# Patient Record
Sex: FEMALE | Race: BLACK OR AFRICAN AMERICAN | NOT HISPANIC OR LATINO | Employment: STUDENT | URBAN - METROPOLITAN AREA
[De-identification: names, ages, dates, MRNs, and addresses within clinical notes are randomized per-mention and may not be internally consistent; named-entity substitution may affect disease eponyms.]

---

## 2023-04-01 LAB
CHLAMYDIA TRACH., AMPLIFIED: NEGATIVE
N. GONORRHEA, AMPLIFIED: NEGATIVE
TRICHOMONAS VAGINALIS: NEGATIVE
URINE CULTURE: NO GROWTH

## 2023-07-06 ENCOUNTER — APPOINTMENT (OUTPATIENT)
Dept: LAB | Facility: LAB | Age: 23
End: 2023-07-06
Payer: COMMERCIAL

## 2023-07-06 LAB
ABO GROUP (TYPE) IN BLOOD: NORMAL
ALANINE AMINOTRANSFERASE (SGPT) (U/L) IN SER/PLAS: 33 U/L (ref 7–45)
ALBUMIN (G/DL) IN SER/PLAS: 4.2 G/DL (ref 3.4–5)
ALKALINE PHOSPHATASE (U/L) IN SER/PLAS: 90 U/L (ref 33–110)
ANION GAP IN SER/PLAS: 15 MMOL/L (ref 10–20)
ANTIBODY SCREEN: NORMAL
ASPARTATE AMINOTRANSFERASE (SGOT) (U/L) IN SER/PLAS: 18 U/L (ref 9–39)
BILIRUBIN TOTAL (MG/DL) IN SER/PLAS: 0.3 MG/DL (ref 0–1.2)
CALCIUM (MG/DL) IN SER/PLAS: 10.4 MG/DL (ref 8.6–10.6)
CARBON DIOXIDE, TOTAL (MMOL/L) IN SER/PLAS: 23 MMOL/L (ref 21–32)
CHLORIDE (MMOL/L) IN SER/PLAS: 102 MMOL/L (ref 98–107)
CREATININE (MG/DL) IN SER/PLAS: 0.7 MG/DL (ref 0.5–1.05)
CREATININE (MG/DL) IN URINE: 390 MG/DL (ref 20–320)
ESTIMATED AVERAGE GLUCOSE FOR HBA1C: 111 MG/DL
GFR FEMALE: >90 ML/MIN/1.73M2
GLUCOSE (MG/DL) IN SER/PLAS: 82 MG/DL (ref 74–99)
HEMOGLOBIN A1C/HEMOGLOBIN TOTAL IN BLOOD: 5.5 %
HEPATITIS B VIRUS SURFACE AG PRESENCE IN SERUM: NONREACTIVE
HEPATITIS C VIRUS AB PRESENCE IN SERUM: NONREACTIVE
HIV 1/ 2 AG/AB SCREEN: NONREACTIVE
LACTATE DEHYDROGENASE (U/L) IN SER/PLAS BY LAC->PYR RXN: 145 U/L (ref 84–246)
POTASSIUM (MMOL/L) IN SER/PLAS: 4.5 MMOL/L (ref 3.5–5.3)
PROTEIN (MG/DL) IN URINE: 21 MG/DL (ref 5–24)
PROTEIN TOTAL: 7.7 G/DL (ref 6.4–8.2)
PROTEIN/CREATININE (MG/MG) IN URINE: 0.05 MG/MG CREAT (ref 0–0.17)
RH FACTOR: NORMAL
RUBELLA VIRUS IGG AB: POSITIVE
SODIUM (MMOL/L) IN SER/PLAS: 135 MMOL/L (ref 136–145)
SYPHILIS TOTAL AB: NONREACTIVE
UREA NITROGEN (MG/DL) IN SER/PLAS: 7 MG/DL (ref 6–23)

## 2023-07-07 LAB
CHLAMYDIA TRACH., AMPLIFIED: NEGATIVE
N. GONORRHEA, AMPLIFIED: NEGATIVE
URINE CULTURE: NORMAL

## 2023-07-10 LAB
HEMOGLOBIN A2: 2.7 %
HEMOGLOBIN A: 96.1 %
HEMOGLOBIN F: 1.2 %
HEMOGLOBIN IDENTIFICATION INTERPRETATION: NORMAL
PATH REVIEW-HGB IDENTIFICATION: NORMAL

## 2023-08-21 PROBLEM — G47.00 INSOMNIA: Status: ACTIVE | Noted: 2023-08-21

## 2023-08-21 PROBLEM — K59.00 CONSTIPATION: Status: ACTIVE | Noted: 2023-08-21

## 2023-08-21 PROBLEM — E66.01 OBESITY, CLASS III, BMI 40-49.9 (MORBID OBESITY) (MULTI): Status: ACTIVE | Noted: 2023-08-21

## 2023-08-21 PROBLEM — K21.9 GERD (GASTROESOPHAGEAL REFLUX DISEASE): Status: ACTIVE | Noted: 2023-08-21

## 2023-08-21 PROBLEM — Z32.02 NEGATIVE PREGNANCY TEST: Status: ACTIVE | Noted: 2023-08-21

## 2023-08-21 PROBLEM — O92.70 UNSPECIFIED DISORDERS OF LACTATION (HHS-HCC): Status: ACTIVE | Noted: 2023-08-21

## 2023-08-21 PROBLEM — Z3A.11 11 WEEKS GESTATION OF PREGNANCY (HHS-HCC): Status: ACTIVE | Noted: 2023-08-21

## 2023-08-21 PROBLEM — N81.9 PROLAPSE OF FEMALE PELVIC ORGANS: Status: ACTIVE | Noted: 2023-08-21

## 2023-08-21 PROBLEM — N81.4: Status: ACTIVE | Noted: 2023-08-21

## 2023-08-21 PROBLEM — Z34.90 PREGNANCY (HHS-HCC): Status: ACTIVE | Noted: 2023-08-21

## 2023-08-21 PROBLEM — R35.0 URINARY FREQUENCY: Status: ACTIVE | Noted: 2023-08-21

## 2023-08-21 PROBLEM — O34.529: Status: ACTIVE | Noted: 2023-08-21

## 2023-08-21 PROBLEM — O99.210 OBESITY IN PREGNANCY (HHS-HCC): Status: ACTIVE | Noted: 2023-08-21

## 2023-08-21 PROBLEM — O99.019 ANEMIA OF PREGNANCY (HHS-HCC): Status: ACTIVE | Noted: 2023-08-21

## 2023-08-21 PROBLEM — O09.93 HIGH-RISK PREGNANCY, THIRD TRIMESTER (HHS-HCC): Status: ACTIVE | Noted: 2023-08-21

## 2023-08-21 PROBLEM — I10 CHRONIC HYPERTENSION: Status: ACTIVE | Noted: 2023-08-21

## 2023-08-21 PROBLEM — O21.9 NAUSEA AND VOMITING DURING PREGNANCY (HHS-HCC): Status: ACTIVE | Noted: 2023-08-21

## 2023-08-21 PROBLEM — E66.813 OBESITY, CLASS III, BMI 40-49.9 (MORBID OBESITY): Status: ACTIVE | Noted: 2023-08-21

## 2023-08-21 RX ORDER — DICYCLOMINE HYDROCHLORIDE 10 MG/1
10 CAPSULE ORAL
COMMUNITY
Start: 2018-06-07 | End: 2023-10-19 | Stop reason: ALTCHOICE

## 2023-08-21 RX ORDER — .BETA.-CAROTENE, ASCORBIC ACID, CHOLECALCIFEROL, .ALPHA.-TOCOPHEROL ACETATE, DL-, THIAMINE, RIBOFLAVIN, NIACINAMIDE, PYRIDOXINE HYDROCHLORIDE, FOLIC ACID, CYANOCOBALAMIN, CALCIUM PANTOTHENATE, CALCIUM CARBONATE, FERROUS FUMARATE, ZINC OXIDE AND DOCUSATE SODIUM 1000; 100; 400; 30; 3; 3; 15; 20; 1; 12; 7; 200; 29; 20; 25 [IU]/1; MG/1; [IU]/1; MG/1; MG/1; MG/1; MG/1; MG/1; MG/1; UG/1; MG/1; MG/1; MG/1; MG/1; MG/1
1 TABLET ORAL DAILY
COMMUNITY
Start: 2023-07-06 | End: 2023-10-19 | Stop reason: SDUPTHER

## 2023-08-21 RX ORDER — IBUPROFEN 600 MG/1
600 TABLET ORAL EVERY 6 HOURS
COMMUNITY
Start: 2022-04-02 | End: 2023-10-19

## 2023-08-21 RX ORDER — POLYETHYLENE GLYCOL 3350 17 G/17G
17 POWDER, FOR SOLUTION ORAL 2 TIMES DAILY
COMMUNITY
End: 2023-10-19 | Stop reason: ALTCHOICE

## 2023-08-21 RX ORDER — DIPHENHYDRAMINE HCL 25 MG
25 CAPSULE ORAL NIGHTLY
Status: ON HOLD | COMMUNITY
End: 2024-02-08 | Stop reason: ALTCHOICE

## 2023-08-21 RX ORDER — PYRIDOXINE HCL (VITAMIN B6) 25 MG
25 TABLET ORAL EVERY 6 HOURS
COMMUNITY
End: 2023-10-19 | Stop reason: SDUPTHER

## 2023-08-21 RX ORDER — NAPROXEN 500 MG/1
500 TABLET ORAL 2 TIMES DAILY PRN
COMMUNITY
Start: 2016-09-15 | End: 2023-10-19 | Stop reason: ALTCHOICE

## 2023-08-21 RX ORDER — PRENATAL WITH FERROUS FUM AND FOLIC ACID 3080; 920; 120; 400; 22; 1.84; 3; 20; 10; 1; 12; 200; 27; 25; 2 [IU]/1; [IU]/1; MG/1; [IU]/1; MG/1; MG/1; MG/1; MG/1; MG/1; MG/1; UG/1; MG/1; MG/1; MG/1; MG/1
1 TABLET ORAL DAILY
COMMUNITY
Start: 2020-01-28 | End: 2023-10-19 | Stop reason: SDUPTHER

## 2023-08-21 RX ORDER — DOCUSATE SODIUM 100 MG/1
100 CAPSULE, LIQUID FILLED ORAL 2 TIMES DAILY
COMMUNITY
Start: 2018-06-12 | End: 2023-10-19 | Stop reason: ALTCHOICE

## 2023-08-21 RX ORDER — ACETAMINOPHEN 500 MG
TABLET ORAL
COMMUNITY
Start: 2022-03-31 | End: 2024-01-18 | Stop reason: WASHOUT

## 2023-08-21 RX ORDER — NIFEDIPINE 30 MG/1
30 TABLET, FILM COATED, EXTENDED RELEASE ORAL DAILY
COMMUNITY
End: 2023-10-19 | Stop reason: ALTCHOICE

## 2023-08-21 RX ORDER — NAPROXEN SODIUM 220 MG/1
2 TABLET, FILM COATED ORAL DAILY
COMMUNITY
Start: 2023-07-06 | End: 2023-10-19 | Stop reason: SDUPTHER

## 2023-08-21 RX ORDER — ONDANSETRON HYDROCHLORIDE 8 MG/1
8 TABLET, FILM COATED ORAL EVERY 8 HOURS PRN
COMMUNITY
End: 2023-10-19 | Stop reason: ALTCHOICE

## 2023-08-21 RX ORDER — METOCLOPRAMIDE 5 MG/1
10 TABLET ORAL EVERY 6 HOURS PRN
COMMUNITY
End: 2023-10-19 | Stop reason: ALTCHOICE

## 2023-08-31 LAB
CHLAMYDIA TRACH., AMPLIFIED: NEGATIVE
CLUE CELLS: ABNORMAL
N. GONORRHEA, AMPLIFIED: NEGATIVE
NUGENT SCORE: 3
TRICHOMONAS VAGINALIS: NEGATIVE
YEAST: PRESENT

## 2023-10-02 ENCOUNTER — ANCILLARY PROCEDURE (OUTPATIENT)
Dept: RADIOLOGY | Facility: CLINIC | Age: 23
End: 2023-10-02
Payer: COMMERCIAL

## 2023-10-02 DIAGNOSIS — O99.212 OBESITY AFFECTING PREGNANCY IN SECOND TRIMESTER (HHS-HCC): ICD-10-CM

## 2023-10-02 DIAGNOSIS — Z36.3 ENCOUNTER FOR ANTENATAL SCREENING FOR MALFORMATIONS (HHS-HCC): ICD-10-CM

## 2023-10-02 PROCEDURE — 76811 OB US DETAILED SNGL FETUS: CPT

## 2023-10-02 PROCEDURE — 76817 TRANSVAGINAL US OBSTETRIC: CPT

## 2023-10-02 PROCEDURE — 76812 OB US DETAILED ADDL FETUS: CPT | Performed by: OBSTETRICS & GYNECOLOGY

## 2023-10-02 PROCEDURE — 76817 TRANSVAGINAL US OBSTETRIC: CPT | Performed by: OBSTETRICS & GYNECOLOGY

## 2023-10-02 PROCEDURE — 76811 OB US DETAILED SNGL FETUS: CPT | Performed by: OBSTETRICS & GYNECOLOGY

## 2023-10-02 PROCEDURE — 76812 OB US DETAILED ADDL FETUS: CPT

## 2023-10-02 ASSESSMENT — EDINBURGH POSTNATAL DEPRESSION SCALE (EPDS)
I HAVE FELT SCARED OR PANICKY FOR NO GOOD REASON: NO, NOT AT ALL
THINGS HAVE BEEN GETTING ON TOP OF ME: NO, MOST OF THE TIME I HAVE COPED QUITE WELL
I HAVE BEEN ANXIOUS OR WORRIED FOR NO GOOD REASON: NO, NOT AT ALL
I HAVE BEEN SO UNHAPPY THAT I HAVE BEEN CRYING: NO, NEVER
I HAVE LOOKED FORWARD WITH ENJOYMENT TO THINGS: RATHER LESS THAN I USED TO
TOTAL SCORE: 3
I HAVE BEEN SO UNHAPPY THAT I HAVE HAD DIFFICULTY SLEEPING: NOT AT ALL
I HAVE BLAMED MYSELF UNNECESSARILY WHEN THINGS WENT WRONG: NO, NEVER
THE THOUGHT OF HARMING MYSELF HAS OCCURRED TO ME: NEVER
I HAVE FELT SAD OR MISERABLE: NO, NOT AT ALL
I HAVE BEEN ABLE TO LAUGH AND SEE THE FUNNY SIDE OF THINGS: NOT QUITE SO MUCH NOW

## 2023-10-19 ENCOUNTER — PHARMACY VISIT (OUTPATIENT)
Dept: PHARMACY | Facility: CLINIC | Age: 23
End: 2023-10-19
Payer: COMMERCIAL

## 2023-10-19 ENCOUNTER — ANCILLARY PROCEDURE (OUTPATIENT)
Dept: RADIOLOGY | Facility: CLINIC | Age: 23
End: 2023-10-19
Payer: COMMERCIAL

## 2023-10-19 ENCOUNTER — ROUTINE PRENATAL (OUTPATIENT)
Dept: MATERNAL FETAL MEDICINE | Facility: CLINIC | Age: 23
End: 2023-10-19
Payer: COMMERCIAL

## 2023-10-19 VITALS
HEART RATE: 88 BPM | WEIGHT: 285 LBS | SYSTOLIC BLOOD PRESSURE: 137 MMHG | BODY MASS INDEX: 47.43 KG/M2 | DIASTOLIC BLOOD PRESSURE: 85 MMHG

## 2023-10-19 DIAGNOSIS — K21.9 GASTROESOPHAGEAL REFLUX DISEASE WITHOUT ESOPHAGITIS: ICD-10-CM

## 2023-10-19 DIAGNOSIS — Z36.3 ENCOUNTER FOR ANTENATAL SCREENING FOR MALFORMATIONS (HHS-HCC): ICD-10-CM

## 2023-10-19 DIAGNOSIS — Z3A.22 22 WEEKS GESTATION OF PREGNANCY (HHS-HCC): ICD-10-CM

## 2023-10-19 DIAGNOSIS — N81.11 MIDLINE CYSTOCELE: ICD-10-CM

## 2023-10-19 DIAGNOSIS — O10.919 CHRONIC HYPERTENSION AFFECTING PREGNANCY (HHS-HCC): Primary | ICD-10-CM

## 2023-10-19 DIAGNOSIS — O99.212 OBESITY AFFECTING PREGNANCY IN SECOND TRIMESTER (HHS-HCC): ICD-10-CM

## 2023-10-19 DIAGNOSIS — O30.042 DICHORIONIC DIAMNIOTIC TWIN PREGNANCY IN SECOND TRIMESTER (HHS-HCC): ICD-10-CM

## 2023-10-19 DIAGNOSIS — O21.9 NAUSEA AND VOMITING IN PREGNANCY PRIOR TO 22 WEEKS GESTATION (HHS-HCC): ICD-10-CM

## 2023-10-19 DIAGNOSIS — O30.92: ICD-10-CM

## 2023-10-19 PROBLEM — O92.70 UNSPECIFIED DISORDERS OF LACTATION (HHS-HCC): Status: RESOLVED | Noted: 2023-08-21 | Resolved: 2023-10-19

## 2023-10-19 PROBLEM — N81.4: Status: RESOLVED | Noted: 2023-08-21 | Resolved: 2023-10-19

## 2023-10-19 PROBLEM — O99.210 OBESITY IN PREGNANCY (HHS-HCC): Status: RESOLVED | Noted: 2023-08-21 | Resolved: 2023-10-19

## 2023-10-19 PROBLEM — O09.93 HIGH-RISK PREGNANCY, THIRD TRIMESTER (HHS-HCC): Status: RESOLVED | Noted: 2023-08-21 | Resolved: 2023-10-19

## 2023-10-19 PROBLEM — R35.0 URINARY FREQUENCY: Status: RESOLVED | Noted: 2023-08-21 | Resolved: 2023-10-19

## 2023-10-19 PROBLEM — O34.529: Status: RESOLVED | Noted: 2023-08-21 | Resolved: 2023-10-19

## 2023-10-19 PROBLEM — Z34.90 PREGNANCY (HHS-HCC): Status: RESOLVED | Noted: 2023-08-21 | Resolved: 2023-10-19

## 2023-10-19 PROCEDURE — 99214 OFFICE O/P EST MOD 30 MIN: CPT

## 2023-10-19 PROCEDURE — 76816 OB US FOLLOW-UP PER FETUS: CPT | Performed by: STUDENT IN AN ORGANIZED HEALTH CARE EDUCATION/TRAINING PROGRAM

## 2023-10-19 PROCEDURE — RXMED WILLOW AMBULATORY MEDICATION CHARGE

## 2023-10-19 PROCEDURE — 99214 OFFICE O/P EST MOD 30 MIN: CPT | Mod: 25,GC | Performed by: OBSTETRICS & GYNECOLOGY

## 2023-10-19 PROCEDURE — 76816 OB US FOLLOW-UP PER FETUS: CPT

## 2023-10-19 RX ORDER — ACETAMINOPHEN 500 MG
1 TABLET ORAL 2 TIMES DAILY
Qty: 1 EACH | Refills: 0 | Status: SHIPPED | OUTPATIENT
Start: 2023-10-19

## 2023-10-19 RX ORDER — PYRIDOXINE HCL (VITAMIN B6) 25 MG
25 TABLET ORAL EVERY 6 HOURS
Qty: 120 TABLET | Refills: 3 | Status: SHIPPED | OUTPATIENT
Start: 2023-10-19 | End: 2024-02-09 | Stop reason: HOSPADM

## 2023-10-19 RX ORDER — ASPIRIN 81 MG/1
162 TABLET ORAL DAILY
Qty: 180 TABLET | Refills: 2 | Status: SHIPPED | OUTPATIENT
Start: 2023-10-19 | End: 2024-01-28 | Stop reason: HOSPADM

## 2023-10-19 RX ORDER — FAMOTIDINE 20 MG/1
20 TABLET, FILM COATED ORAL DAILY
Qty: 90 TABLET | Refills: 2 | Status: SHIPPED | OUTPATIENT
Start: 2023-10-19 | End: 2023-12-21

## 2023-10-19 ASSESSMENT — PAIN SCALES - GENERAL: PAINLEVEL_OUTOF10: 0 - NO PAIN

## 2023-10-19 ASSESSMENT — PAIN - FUNCTIONAL ASSESSMENT: PAIN_FUNCTIONAL_ASSESSMENT: 0-10

## 2023-10-19 NOTE — PROGRESS NOTES
MFM Follow-up  10/19/2023         SUBJECTIVE    HPI: Ivanna Addison is a 23 y.o.  at 22w3d here for RPNV for cHTN and di/di twin gestation. Denies contractions, bleeding, or LOF. Reports normal fetal movement. Still having some nausea.    OBJECTIVE    Visit Vitals  /85   Pulse 88   Wt 129 kg (285 lb)   LMP 2023   BMI 47.43 kg/m²   OB Status Pregnant   Smoking Status Never   BSA 2.43 m²        FHT: US    ASSESSMENT & PLAN    Ivanna Addison is a 23 y.o.  at 22w3d here for the following concerns we addressed today:    Chronic hypertension affecting pregnancy  Normotensive today. Needs new cuff at home- her current one is too small. Larger cuff sent. bASA refilled    22 weeks gestation of pregnancy  Up to date. 2nd tri labs and flu vaccine at next visit    Dichorionic diamniotic twin pregnancy in second trimester  Anatomy completion US today. 24week growth US in 2 weeks    Prolapse of female pelvic organs  No currently having symptoms. She has PFPT scheduled. Urogyn consult     GERD (gastroesophageal reflux disease)  Has been worsening. Pepcid rxed     No orders of the defined types were placed in this encounter.       RTC in  2 weeks    Patient seen and evaluated with Dr. Vandana Munoz MD PGY-1    I saw and evaluated the patient. I personally obtained the key and critical portions of the history and physical exam or was physically present for key and critical portions performed by the resident/fellow. I reviewed the resident/fellow's documentation and discussed the patient with the resident/fellow. I agree with the resident/fellow's medical decision making as documented in the note.    Doing well overall. Severe GERD - pepcid started at daily dose, recommend increasing to twice weekly if not improving. Has urogyn appointment scheduled on . Growth in 2 weeks, will see her at that time as well.    Jesus Ross MD  Maternal Fetal Medicine

## 2023-10-19 NOTE — ASSESSMENT & PLAN NOTE
Normotensive today. Needs new cuff at home- her current one is too small. Larger cuff sent. Basilio refilled

## 2023-11-07 ENCOUNTER — OFFICE VISIT (OUTPATIENT)
Dept: UROLOGY | Facility: CLINIC | Age: 23
End: 2023-11-07
Payer: COMMERCIAL

## 2023-11-07 VITALS — SYSTOLIC BLOOD PRESSURE: 128 MMHG | DIASTOLIC BLOOD PRESSURE: 85 MMHG

## 2023-11-07 DIAGNOSIS — N81.10 BLADDER PROLAPSE, FEMALE, ACQUIRED: ICD-10-CM

## 2023-11-07 LAB
POC BILIRUBIN, URINE: ABNORMAL
POC BLOOD, URINE: NEGATIVE
POC GLUCOSE, URINE: NEGATIVE MG/DL
POC KETONES, URINE: ABNORMAL MG/DL
POC LEUKOCYTES, URINE: NEGATIVE
POC NITRITE,URINE: NEGATIVE
POC PH, URINE: 5.5 PH
POC PROTEIN, URINE: ABNORMAL MG/DL
POC SPECIFIC GRAVITY, URINE: >=1.03
POC UROBILINOGEN, URINE: 0.2 EU/DL

## 2023-11-07 PROCEDURE — 81003 URINALYSIS AUTO W/O SCOPE: CPT | Performed by: STUDENT IN AN ORGANIZED HEALTH CARE EDUCATION/TRAINING PROGRAM

## 2023-11-07 PROCEDURE — 3079F DIAST BP 80-89 MM HG: CPT | Performed by: STUDENT IN AN ORGANIZED HEALTH CARE EDUCATION/TRAINING PROGRAM

## 2023-11-07 PROCEDURE — 99215 OFFICE O/P EST HI 40 MIN: CPT | Performed by: STUDENT IN AN ORGANIZED HEALTH CARE EDUCATION/TRAINING PROGRAM

## 2023-11-07 PROCEDURE — 3074F SYST BP LT 130 MM HG: CPT | Performed by: STUDENT IN AN ORGANIZED HEALTH CARE EDUCATION/TRAINING PROGRAM

## 2023-11-07 NOTE — PROGRESS NOTES
"  HISTORY OF PRESENT ILLNESS:  This is a 23 @ y.o. female,who presents a a new patient for a prolapsed bladder. Patient is pregnant with twins currently. Patient noticed she has a prolapse due to the pregnancy. She can feel a small bulge when she wipes now. She states she would prefer to have a vaginal birth, if possible. Patient has a due date of February 19, 2024 which will be close to 40 weeks. Patient has three children with ages of 5, 3 and 1 years old currently. She is not planning to have any more children after this next birth. She states she will be having a tubal ligation surgery after the delivery. She does not seem to have any urinary symptoms prior to pregnancy.              HISTORY OF PRESENT ILLNESS:           Past Medical History  She has a past medical history of Chronic hypertension and Eczema.    Surgical History  She has a past surgical history that includes Cholecystectomy (N/A, 2018).     Social History  She reports that she has never smoked. She does not have any smokeless tobacco history on file. She reports that she does not drink alcohol and does not use drugs.    Family History  Family History   Problem Relation Name Age of Onset    Diabetes Maternal Grandmother      Hypertension Maternal Grandmother          Allergies  Patient has no known allergies.      A comprehensive 10+ review of systems was negative except for: see hpi                          PHYSICAL EXAMINATION:  BP Readings from Last 3 Encounters:   11/07/23 128/85   10/19/23 137/85   07/27/23 124/78      Wt Readings from Last 3 Encounters:   10/19/23 129 kg (285 lb)   07/27/23 125 kg (275 lb)   07/18/23 125 kg (276 lb 8 oz)      BMI: Estimated body mass index is 47.43 kg/m² as calculated from the following:    Height as of 7/18/23: 1.651 m (5' 5\").    Weight as of 10/19/23: 129 kg (285 lb).  BSA: Estimated body surface area is 2.43 meters squared as calculated from the following:    Height as of 7/18/23: 1.651 m (5' 5\").    " Weight as of 10/19/23: 129 kg (285 lb).  HEENT: Normocephalic, atraumatic, PER EOMI, nonicteric, trachea normal, thyroid normal, oropharynx normal.  CARDIAC: regular rate & rhythm, S1 & S2 normal.  No heaves, thrills, gallops or murmurs.  LUNGS: Clear to auscultation, no spinal or CV tenderness.  EXTREMITIES: No evidence of cyanosis, clubbing or edema.                 IMPRESSION AND PLAN:  24 yo with current twin pregnancy has c/o POP when not pregnant     Discusses etiology and mechanism of POP, also discussed treatment options  We discussed that best practice is minimum of 9-12 months after pregnancy for any corrective surgery  Follow up in May 2024 after birth

## 2023-11-08 PROBLEM — Z3A.25 25 WEEKS GESTATION OF PREGNANCY (HHS-HCC): Status: ACTIVE | Noted: 2023-08-21

## 2023-11-08 PROBLEM — O16.2 HYPERTENSION AFFECTING PREGNANCY IN SECOND TRIMESTER (HHS-HCC): Status: ACTIVE | Noted: 2023-08-21

## 2023-11-08 PROBLEM — O99.612 GASTROESOPHAGEAL REFLUX DURING PREGNANCY IN SECOND TRIMESTER, ANTEPARTUM (HHS-HCC): Status: ACTIVE | Noted: 2023-08-21

## 2023-11-09 ENCOUNTER — ROUTINE PRENATAL (OUTPATIENT)
Dept: MATERNAL FETAL MEDICINE | Facility: CLINIC | Age: 23
End: 2023-11-09
Payer: COMMERCIAL

## 2023-11-09 ENCOUNTER — APPOINTMENT (OUTPATIENT)
Dept: RADIOLOGY | Facility: CLINIC | Age: 23
End: 2023-11-09
Payer: COMMERCIAL

## 2023-11-09 ENCOUNTER — ANCILLARY PROCEDURE (OUTPATIENT)
Dept: RADIOLOGY | Facility: CLINIC | Age: 23
End: 2023-11-09
Payer: COMMERCIAL

## 2023-11-09 VITALS — BODY MASS INDEX: 48.81 KG/M2 | SYSTOLIC BLOOD PRESSURE: 123 MMHG | WEIGHT: 293 LBS | DIASTOLIC BLOOD PRESSURE: 78 MMHG

## 2023-11-09 DIAGNOSIS — O99.612 GASTROESOPHAGEAL REFLUX DURING PREGNANCY IN SECOND TRIMESTER, ANTEPARTUM (HHS-HCC): ICD-10-CM

## 2023-11-09 DIAGNOSIS — Z3A.25 25 WEEKS GESTATION OF PREGNANCY (HHS-HCC): ICD-10-CM

## 2023-11-09 DIAGNOSIS — Z36.3 ENCOUNTER FOR ANTENATAL SCREENING FOR MALFORMATIONS (HHS-HCC): ICD-10-CM

## 2023-11-09 DIAGNOSIS — Z03.74 ENCOUNTER FOR SUSPECTED PROBLEM WITH FETAL GROWTH RULED OUT: ICD-10-CM

## 2023-11-09 DIAGNOSIS — O30.049 TWIN PREGNANCY, DICHORIONIC/DIAMNIOTIC, UNSPECIFIED TRIMESTER (HHS-HCC): ICD-10-CM

## 2023-11-09 DIAGNOSIS — O16.2 HYPERTENSION AFFECTING PREGNANCY IN SECOND TRIMESTER (HHS-HCC): ICD-10-CM

## 2023-11-09 DIAGNOSIS — K21.9 GASTROESOPHAGEAL REFLUX DURING PREGNANCY IN SECOND TRIMESTER, ANTEPARTUM (HHS-HCC): ICD-10-CM

## 2023-11-09 DIAGNOSIS — N81.11 MIDLINE CYSTOCELE: ICD-10-CM

## 2023-11-09 DIAGNOSIS — R10.2 PELVIC PAIN IN PREGNANCY (HHS-HCC): ICD-10-CM

## 2023-11-09 DIAGNOSIS — O30.042 DICHORIONIC DIAMNIOTIC TWIN PREGNANCY IN SECOND TRIMESTER (HHS-HCC): Primary | ICD-10-CM

## 2023-11-09 DIAGNOSIS — Z23 INFLUENZA VACCINATION ADMINISTERED AT CURRENT VISIT: ICD-10-CM

## 2023-11-09 DIAGNOSIS — O26.899 PELVIC PAIN IN PREGNANCY (HHS-HCC): ICD-10-CM

## 2023-11-09 PROBLEM — O26.892 PELVIC PAIN AFFECTING PREGNANCY IN SECOND TRIMESTER, ANTEPARTUM (HHS-HCC): Status: ACTIVE | Noted: 2023-11-09

## 2023-11-09 PROCEDURE — 90686 IIV4 VACC NO PRSV 0.5 ML IM: CPT | Performed by: OBSTETRICS & GYNECOLOGY

## 2023-11-09 PROCEDURE — 99214 OFFICE O/P EST MOD 30 MIN: CPT | Performed by: OBSTETRICS & GYNECOLOGY

## 2023-11-09 PROCEDURE — 76816 OB US FOLLOW-UP PER FETUS: CPT

## 2023-11-09 PROCEDURE — 99214 OFFICE O/P EST MOD 30 MIN: CPT | Mod: GC | Performed by: OBSTETRICS & GYNECOLOGY

## 2023-11-09 NOTE — PROGRESS NOTES
MFM Follow-up  2023         SUBJECTIVE    HPI: Ivanna Addison is a 23 y.o.  at 25w3d with di/di twins here for MFM FUV for cHTN.     Denies vaginal bleeding, LOF. Good fetal movement. Has been having Carm[place] Wheeler contractions nightly, felt in R pelvis and lower back, along with sharp vaginal pain. Contractions lasting 30 seconds and irregular, but can occur every 1-2 minutes. Does not feel like labor, based on her prior pregnancies. Had belly band ordered about 6 weeks ago, but has not come in yet.    Denies PEC sxs, however has occasional headaches, less than once per week that resolve without medication.     OBJECTIVE    Visit Vitals  /78 Comment:    Wt 133 kg (293 lb 4.8 oz)   LMP 2023   BMI 48.81 kg/m²   OB Status Pregnant   Smoking Status Never   BSA 2.47 m²        FHT: US    ASSESSMENT & PLAN    Ivanna Addison is a 23 y.o.  at 25w3d here for the following concerns we addressed today:    Hypertension affecting pregnancy in second trimester  - Normotensive in office today. Worried home BP cuff is too small, because her home readings were high. Has not been taking BP at home due to cuff issue. Discussed bringing in cuff to confirm sizing.  - continue bASA  - Continue serial growths q4wks provided appropriate interval growth. Has growth US today    Prolapse of female pelvic organs  - Saw urogyn on   Discussed following up postpartum for any corrective surgery. Has appt scheduled 2024  - Patient interested in pelvic floor physical therapy. Referral placed    25 weeks gestation of pregnancy  - For 2nd trimester labs and 1hr GTT in 3 weeks, prior to next visit.  Flu shot today    Dichorionic diamniotic twin pregnancy in second trimester  - Has growth today  - Continue with q4 week growth, normal growth today.    Pelvic pain in pregnancy  - Nightly [place] Wheeler Hydration  - Discussed good hydration for uterine irritability, however she should go to triage if her  contractions are less than 5 minutes apart, persisting for longer than an hour, and painful    Gastroesophageal reflux during pregnancy in second trimester, antepartum  Reports only having issues with GERD twice since the last visit. Nightly burning pain when it happens       Orders Placed This Encounter   Procedures    Flu vaccine (IIV4) age 6 months and greater, preservative free    Glucose, 1 Hour Screen, Pregnancy     Standing Status:   Future     Standing Expiration Date:   2024     Order Specific Question:   Release result to MyChart     Answer:   Immediate [1]    CBC     Standing Status:   Future     Standing Expiration Date:   2024     Order Specific Question:   Release result to MyChart     Answer:   Immediate [1]    Syphilis Screen with Reflex     Standing Status:   Future     Standing Expiration Date:   2024     Order Specific Question:   Release result to MyChart     Answer:   Immediate [1]    Referral to Physical Therapy     Standing Status:   Future     Standing Expiration Date:   2024     Referral Priority:   Routine     Referral Type:   Consultation     Referral Reason:   Specialty Services Required     Requested Specialty:   Physical Therapy     Number of Visits Requested:   1        RTC in 4 weeks for FUV and growth US    Patient seen and evaluated with Dr. Vandana Roche MD      I saw and evaluated the patient. I personally obtained the key and critical portions of the history and physical exam or was physically present for key and critical portions performed by the resident/fellow. I reviewed the resident/fellow's documentation and discussed the patient with the resident/fellow. I agree with the resident/fellow's medical decision making as documented in the note.     Briefly,  at 25w3d with DCDA twins, CHTN, and pelvic prolapse. Patient disappointed that intervention for prolapse needs to be deferred but understands the reasoning. Normotensive. Normal  growth for twins. RTC for visit and US in 4 weeks, patient to have 3rd trimester labs drawn prior to that visit.    MD BETI WattersM

## 2023-11-09 NOTE — ASSESSMENT & PLAN NOTE
- Saw urogyn on 11/7  Discussed following up postpartum for any corrective surgery. Has appt scheduled 5/2024  - Patient interested in pelvic floor physical therapy. Referral placed

## 2023-11-09 NOTE — ASSESSMENT & PLAN NOTE
Reports only having issues with GERD twice since the last visit. Nightly burning pain when it happens

## 2023-11-09 NOTE — ASSESSMENT & PLAN NOTE
- Normotensive in office today. Worried home BP cuff is too small, because her home readings were high. Has not been taking BP at home due to cuff issue. Discussed bringing in cuff to confirm sizing.  - continue bASA  - Continue serial growths q4wks provided appropriate interval growth. Has growth US today

## 2023-11-09 NOTE — ASSESSMENT & PLAN NOTE
- Nightly Carmine Wheeler Hydration  - Discussed good hydration for uterine irritability, however she should go to triage if her contractions are less than 5 minutes apart, persisting for longer than an hour, and painful

## 2023-11-29 ENCOUNTER — PHARMACY VISIT (OUTPATIENT)
Dept: PHARMACY | Facility: CLINIC | Age: 23
End: 2023-11-29
Payer: MEDICAID

## 2023-11-29 PROCEDURE — RXMED WILLOW AMBULATORY MEDICATION CHARGE

## 2023-12-06 PROBLEM — Z3A.29 29 WEEKS GESTATION OF PREGNANCY (HHS-HCC): Status: ACTIVE | Noted: 2023-08-21

## 2023-12-07 ENCOUNTER — ROUTINE PRENATAL (OUTPATIENT)
Dept: MATERNAL FETAL MEDICINE | Facility: CLINIC | Age: 23
End: 2023-12-07
Payer: COMMERCIAL

## 2023-12-07 ENCOUNTER — ANCILLARY PROCEDURE (OUTPATIENT)
Dept: RADIOLOGY | Facility: CLINIC | Age: 23
End: 2023-12-07
Payer: COMMERCIAL

## 2023-12-07 ENCOUNTER — LAB (OUTPATIENT)
Dept: LAB | Facility: LAB | Age: 23
End: 2023-12-07
Payer: COMMERCIAL

## 2023-12-07 VITALS — WEIGHT: 293 LBS | DIASTOLIC BLOOD PRESSURE: 75 MMHG | BODY MASS INDEX: 49.56 KG/M2 | SYSTOLIC BLOOD PRESSURE: 122 MMHG

## 2023-12-07 DIAGNOSIS — K21.9 GASTROESOPHAGEAL REFLUX DURING PREGNANCY IN SECOND TRIMESTER, ANTEPARTUM (HHS-HCC): ICD-10-CM

## 2023-12-07 DIAGNOSIS — Z3A.29 29 WEEKS GESTATION OF PREGNANCY (HHS-HCC): Primary | ICD-10-CM

## 2023-12-07 DIAGNOSIS — R10.2 PELVIC PAIN IN PREGNANCY (HHS-HCC): ICD-10-CM

## 2023-12-07 DIAGNOSIS — O30.049 TWIN PREGNANCY, DICHORIONIC/DIAMNIOTIC, UNSPECIFIED TRIMESTER (HHS-HCC): ICD-10-CM

## 2023-12-07 DIAGNOSIS — O99.213 OBESITY AFFECTING PREGNANCY IN THIRD TRIMESTER (HHS-HCC): ICD-10-CM

## 2023-12-07 DIAGNOSIS — N81.11 MIDLINE CYSTOCELE: ICD-10-CM

## 2023-12-07 DIAGNOSIS — Z3A.25 25 WEEKS GESTATION OF PREGNANCY (HHS-HCC): ICD-10-CM

## 2023-12-07 DIAGNOSIS — O99.612 GASTROESOPHAGEAL REFLUX DURING PREGNANCY IN SECOND TRIMESTER, ANTEPARTUM (HHS-HCC): ICD-10-CM

## 2023-12-07 DIAGNOSIS — E66.01 OBESITY, CLASS III, BMI 40-49.9 (MORBID OBESITY) (MULTI): ICD-10-CM

## 2023-12-07 DIAGNOSIS — Z03.74 ENCOUNTER FOR SUSPECTED PROBLEM WITH FETAL GROWTH RULED OUT: ICD-10-CM

## 2023-12-07 DIAGNOSIS — O30.042 DICHORIONIC DIAMNIOTIC TWIN PREGNANCY IN SECOND TRIMESTER (HHS-HCC): ICD-10-CM

## 2023-12-07 DIAGNOSIS — O26.899 PELVIC PAIN IN PREGNANCY (HHS-HCC): ICD-10-CM

## 2023-12-07 DIAGNOSIS — O10.013 PRE-EXISTING ESSENTIAL HYPERTENSION COMPLICATING PREGNANCY, THIRD TRIMESTER (HHS-HCC): ICD-10-CM

## 2023-12-07 DIAGNOSIS — O16.2 HYPERTENSION AFFECTING PREGNANCY IN SECOND TRIMESTER (HHS-HCC): ICD-10-CM

## 2023-12-07 PROCEDURE — 99214 OFFICE O/P EST MOD 30 MIN: CPT | Mod: GC | Performed by: STUDENT IN AN ORGANIZED HEALTH CARE EDUCATION/TRAINING PROGRAM

## 2023-12-07 PROCEDURE — 76816 OB US FOLLOW-UP PER FETUS: CPT | Mod: 76

## 2023-12-07 PROCEDURE — 99214 OFFICE O/P EST MOD 30 MIN: CPT | Performed by: STUDENT IN AN ORGANIZED HEALTH CARE EDUCATION/TRAINING PROGRAM

## 2023-12-07 PROCEDURE — 90715 TDAP VACCINE 7 YRS/> IM: CPT | Performed by: STUDENT IN AN ORGANIZED HEALTH CARE EDUCATION/TRAINING PROGRAM

## 2023-12-07 PROCEDURE — 76819 FETAL BIOPHYS PROFIL W/O NST: CPT | Performed by: STUDENT IN AN ORGANIZED HEALTH CARE EDUCATION/TRAINING PROGRAM

## 2023-12-07 PROCEDURE — 76816 OB US FOLLOW-UP PER FETUS: CPT | Performed by: STUDENT IN AN ORGANIZED HEALTH CARE EDUCATION/TRAINING PROGRAM

## 2023-12-07 PROCEDURE — 82947 ASSAY GLUCOSE BLOOD QUANT: CPT

## 2023-12-07 PROCEDURE — 86780 TREPONEMA PALLIDUM: CPT

## 2023-12-07 PROCEDURE — 36415 COLL VENOUS BLD VENIPUNCTURE: CPT

## 2023-12-07 PROCEDURE — 85027 COMPLETE CBC AUTOMATED: CPT

## 2023-12-07 PROCEDURE — 76819 FETAL BIOPHYS PROFIL W/O NST: CPT | Mod: 76

## 2023-12-07 RX ORDER — ACETAMINOPHEN 500 MG
1 TABLET ORAL DAILY
Qty: 1 EACH | Refills: 0 | Status: SHIPPED | OUTPATIENT
Start: 2023-12-07 | End: 2024-01-18 | Stop reason: WASHOUT

## 2023-12-07 ASSESSMENT — ENCOUNTER SYMPTOMS
CARDIOVASCULAR NEGATIVE: 0
RESPIRATORY NEGATIVE: 0
HEMATOLOGIC/LYMPHATIC NEGATIVE: 0
ALLERGIC/IMMUNOLOGIC NEGATIVE: 0
ENDOCRINE NEGATIVE: 0
MUSCULOSKELETAL NEGATIVE: 0
GASTROINTESTINAL NEGATIVE: 0
NEUROLOGICAL NEGATIVE: 0
PSYCHIATRIC NEGATIVE: 0
CONSTITUTIONAL NEGATIVE: 0
EYES NEGATIVE: 0

## 2023-12-07 NOTE — PROGRESS NOTES
"MFM Follow-up  2023       SUBJECTIVE     HPI: Ivanna Addison is a 23 y.o.  at 29w3d with di/di twins here for MFM FUV for cHTN, on no medications      Denies vaginal bleeding, LOF. Good fetal movement. X2 Has been having [place] Wheeler contractions and vaginal pressure. Breech/vertex on us today, desires vaginal delivery if A becomes vertex     Pregnancy support belt helping, No chest pain, shortness of breath, vision changes, headache, or RUQ pain. Her home BP cuff is too small per patient, has wrist cuff also but has not been using it, keeps forgetting to take her home Bps.     Her pregnancy is complicated by:  - BMI 50  - cHTN, no meds,   - Di/di twin pregnancy      Objective   Physical Exam:   Weight: 135 kg (297 lb 12.8 oz)  Expected Total Weight Gain: 11 kg (24 lb)-19 kg (41 lb)   Pregravid BMI: 46.26  BP: 122/75  Fetal Heart Rate: us   Presentation: Breech (Vertex Twin B)  Dilation: Closed   Fetal Station: -3    Prenatal Labs  Urine Dip:  Lab Results   Component Value Date    KETONESU TRACE (A) 2023     Lab Results   Component Value Date    HGB 11.9 (L) 2023    HCT 36.3 2023    ABO A 2023    HEPBSAG NONREACTIVE 2023     No results found for: \"PAPPA\", \"AFP\", \"HCG\", \"ESTRIOL\", \"INHBA\"  Lab Results   Component Value Date    GLUF 88 2021         Assessment/Plan   Ivanna Addison is a 23 y.o.  at 29w3d with di/di twins here for MFM FUV for cHTN, on no medications     Problem List Items Addressed This Visit       29 weeks gestation of pregnancy - Primary    Overview     [x] labs uptodate  [X] Anatomy US  [ ] 2nd tri labs ()  [ ] 1 hr gtt  [X] tdap   [x] flu   [x] declined covid  [ ] GBS  [ ]ppBC: ppIUD  [ ] breast feeding, for pump rx   [ ]Delivery plannin-38 weeks, A breech             Relevant Orders    Tdap vaccine, age 7 years and older  (BOOSTRIX) (Completed)    Dichorionic diamniotic twin pregnancy in second trimester    Overview     [ " ] serial growths beginning 24-28 weeks  [ ] delivery 37-38 weeks         Gastroesophageal reflux during pregnancy in second trimester, antepartum    Overview     Takes Tums when it bothers her.  Pepcid added         Hypertension affecting pregnancy in second trimester    Overview     [x] Baseline HELLP Labs, P:C 0.05  [X] bASA  [ ]  Testing  [ ] Echo vs EKG    Serial Growths    Was on nifedipine 30 prior to pregnancy, no meds currently            Relevant Medications    blood pressure test kit-large kit    Obesity, Class III, BMI 40-49.9 (morbid obesity) (CMS/[facility])    Pelvic pain in pregnancy    Overview     Pregnancy support belt          Prolapse of female pelvic organs    Overview     Patient reports hx of intermittent pelvic organ prolapse during pregnancy, beginning in  pregnancy. Went on to have  x3.   - Occurs during Valsalva, usually with constipation. Previously instructed to manually reduce.   - Pt has regular soft BM's now, has miralax to use prn   - pelvic floor therapy; previously declined pessary placement per patient   [ x] Urogynecology apt  -Discussed following up postpartum for any corrective surgery. Has appt scheduled 2024               Continue prenatal vitamin.  1-hour and 2nd/3rd trimester labs today, Tdap today     Expected mode of delivery : pending fetal presentation   Follow up in 2 weeks for a routine prenatal visit, 3 weeks for growth us     BP cuff re sent  Tdap today      Pt seen and dw Dr Atif Morel MD  OB/GYN PGY3    I saw and evaluated the patient. I personally obtained the key and critical portions of the history and physical exam or was physically present for key and critical portions performed by the resident/fellow. I reviewed the resident/fellow's documentation and discussed the patient with the resident/fellow. I agree with the resident/fellow's medical decision making as documented in the note.    Ruben Srivastava MD

## 2023-12-08 LAB
ERYTHROCYTE [DISTWIDTH] IN BLOOD BY AUTOMATED COUNT: 13.5 % (ref 11.5–14.5)
GLUCOSE 1H P 50 G GLC PO SERPL-MCNC: 125 MG/DL
HCT VFR BLD AUTO: 32.4 % (ref 36–46)
HGB BLD-MCNC: 10.2 G/DL (ref 12–16)
MCH RBC QN AUTO: 27.1 PG (ref 26–34)
MCHC RBC AUTO-ENTMCNC: 31.5 G/DL (ref 32–36)
MCV RBC AUTO: 86 FL (ref 80–100)
NRBC BLD-RTO: 0 /100 WBCS (ref 0–0)
PLATELET # BLD AUTO: 262 X10*3/UL (ref 150–450)
RBC # BLD AUTO: 3.77 X10*6/UL (ref 4–5.2)
T PALLIDUM AB SER QL: NONREACTIVE
WBC # BLD AUTO: 6.5 X10*3/UL (ref 4.4–11.3)

## 2023-12-19 PROBLEM — Z3A.31 31 WEEKS GESTATION OF PREGNANCY (HHS-HCC): Status: ACTIVE | Noted: 2023-08-21

## 2023-12-21 ENCOUNTER — ROUTINE PRENATAL (OUTPATIENT)
Dept: MATERNAL FETAL MEDICINE | Facility: CLINIC | Age: 23
End: 2023-12-21
Payer: COMMERCIAL

## 2023-12-21 ENCOUNTER — LAB (OUTPATIENT)
Dept: LAB | Facility: LAB | Age: 23
End: 2023-12-21
Payer: COMMERCIAL

## 2023-12-21 VITALS — WEIGHT: 293 LBS | DIASTOLIC BLOOD PRESSURE: 84 MMHG | SYSTOLIC BLOOD PRESSURE: 127 MMHG | BODY MASS INDEX: 49.94 KG/M2

## 2023-12-21 DIAGNOSIS — D50.9 IRON DEFICIENCY ANEMIA OF PREGNANCY (HHS-HCC): ICD-10-CM

## 2023-12-21 DIAGNOSIS — Z3A.31 31 WEEKS GESTATION OF PREGNANCY (HHS-HCC): ICD-10-CM

## 2023-12-21 DIAGNOSIS — O16.2 HYPERTENSION AFFECTING PREGNANCY IN SECOND TRIMESTER (HHS-HCC): ICD-10-CM

## 2023-12-21 DIAGNOSIS — K21.9 GASTROESOPHAGEAL REFLUX DURING PREGNANCY IN SECOND TRIMESTER, ANTEPARTUM (HHS-HCC): ICD-10-CM

## 2023-12-21 DIAGNOSIS — O99.013 ANEMIA AFFECTING PREGNANCY IN THIRD TRIMESTER (HHS-HCC): ICD-10-CM

## 2023-12-21 DIAGNOSIS — O99.612 GASTROESOPHAGEAL REFLUX DURING PREGNANCY IN SECOND TRIMESTER, ANTEPARTUM (HHS-HCC): ICD-10-CM

## 2023-12-21 DIAGNOSIS — O30.042 DICHORIONIC DIAMNIOTIC TWIN PREGNANCY IN SECOND TRIMESTER (HHS-HCC): ICD-10-CM

## 2023-12-21 DIAGNOSIS — N81.11 MIDLINE CYSTOCELE: ICD-10-CM

## 2023-12-21 DIAGNOSIS — R10.2 PELVIC PAIN IN PREGNANCY (HHS-HCC): ICD-10-CM

## 2023-12-21 DIAGNOSIS — O26.899 PELVIC PAIN IN PREGNANCY (HHS-HCC): ICD-10-CM

## 2023-12-21 DIAGNOSIS — O99.019 IRON DEFICIENCY ANEMIA OF PREGNANCY (HHS-HCC): ICD-10-CM

## 2023-12-21 LAB
FERRITIN SERPL-MCNC: 13 NG/ML (ref 8–150)
FOLATE SERPL-MCNC: 15.8 NG/ML
IRON SATN MFR SERPL: ABNORMAL %
IRON SERPL-MCNC: 40 UG/DL (ref 35–150)
TIBC SERPL-MCNC: ABNORMAL UG/DL
UIBC SERPL-MCNC: >450 UG/DL (ref 110–370)
VIT B12 SERPL-MCNC: 526 PG/ML (ref 211–911)

## 2023-12-21 PROCEDURE — RXMED WILLOW AMBULATORY MEDICATION CHARGE

## 2023-12-21 PROCEDURE — 82746 ASSAY OF FOLIC ACID SERUM: CPT

## 2023-12-21 PROCEDURE — 36415 COLL VENOUS BLD VENIPUNCTURE: CPT

## 2023-12-21 PROCEDURE — 83550 IRON BINDING TEST: CPT

## 2023-12-21 PROCEDURE — 82607 VITAMIN B-12: CPT

## 2023-12-21 PROCEDURE — 82728 ASSAY OF FERRITIN: CPT

## 2023-12-21 PROCEDURE — 99214 OFFICE O/P EST MOD 30 MIN: CPT | Performed by: STUDENT IN AN ORGANIZED HEALTH CARE EDUCATION/TRAINING PROGRAM

## 2023-12-21 PROCEDURE — 83540 ASSAY OF IRON: CPT

## 2023-12-21 RX ORDER — FERROUS GLUCONATE 256(28)MG
28 TABLET ORAL EVERY OTHER DAY
Qty: 45 TABLET | Refills: 3 | Status: SHIPPED | OUTPATIENT
Start: 2023-12-21 | End: 2023-12-21 | Stop reason: ALTCHOICE

## 2023-12-21 RX ORDER — FERROUS GLUCONATE 325 MG
38 TABLET ORAL EVERY OTHER DAY
Qty: 45 TABLET | Refills: 3 | Status: SHIPPED | OUTPATIENT
Start: 2023-12-21 | End: 2024-02-09 | Stop reason: HOSPADM

## 2023-12-21 ASSESSMENT — ENCOUNTER SYMPTOMS
CONSTITUTIONAL NEGATIVE: 0
EYES NEGATIVE: 0
PSYCHIATRIC NEGATIVE: 0
RESPIRATORY NEGATIVE: 0
HEMATOLOGIC/LYMPHATIC NEGATIVE: 0
MUSCULOSKELETAL NEGATIVE: 0
ALLERGIC/IMMUNOLOGIC NEGATIVE: 0
GASTROINTESTINAL NEGATIVE: 0
ENDOCRINE NEGATIVE: 0
NEUROLOGICAL NEGATIVE: 0
CARDIOVASCULAR NEGATIVE: 0

## 2023-12-21 NOTE — PROGRESS NOTES
MFM Follow-up  2023         SUBJECTIVE    HPI: Ivanna Addison is a 23 y.o.  at 31w3d here for RPNV. Denies bleeding or LOF. Intermittent roge-prince CTXs. Reports normal fetal movement x2. Patient reports doing well, some pelvic pressure.    OBJECTIVE    Visit Vitals  /84   Wt 136 kg (300 lb 1.6 oz)   LMP 2023   BMI 49.94 kg/m²   OB Status Pregnant   Smoking Status Never   BSA 2.5 m²      FHT A: 136  FHT B: 143    ASSESSMENT & PLAN    Ivanna Addison is a 23 y.o.  at 31w3d with di/di twins here for the following concerns we addressed today:    31 weeks gestation of pregnancy  Di/di gestation  - doing well  - 3rd tri labs completed   - hgb 10.2, anemia panel ordered    Hypertension affecting pregnancy in second trimester  - normotensive today and at home  - no meds  - continue  testing       Orders Placed This Encounter   Procedures    Ferritin     Standing Status:   Future     Standing Expiration Date:   2024     Order Specific Question:   Release result to [employer]     Answer:   Immediate [1]    Iron and TIBC     Standing Status:   Future     Standing Expiration Date:   2024     Order Specific Question:   Release result to [employer]     Answer:   Immediate [1]    Folate     Standing Status:   Future     Standing Expiration Date:   2024     Order Specific Question:   Release result to [employer]     Answer:   Immediate [1]    Vitamin B12     Standing Status:   Future     Standing Expiration Date:   2024     Order Specific Question:   Release result to [employer]     Answer:   Immediate [1]        RTC in 2 weeks    Patient seen and evaluated with Dr. Atif Boothe MD, PGY-3

## 2023-12-28 ENCOUNTER — ANCILLARY PROCEDURE (OUTPATIENT)
Dept: RADIOLOGY | Facility: CLINIC | Age: 23
End: 2023-12-28
Payer: COMMERCIAL

## 2023-12-28 ENCOUNTER — PHARMACY VISIT (OUTPATIENT)
Dept: PHARMACY | Facility: CLINIC | Age: 23
End: 2023-12-28
Payer: MEDICAID

## 2023-12-28 DIAGNOSIS — Z03.74 ENCOUNTER FOR SUSPECTED PROBLEM WITH FETAL GROWTH RULED OUT: ICD-10-CM

## 2023-12-28 DIAGNOSIS — O30.049 TWIN PREGNANCY, DICHORIONIC/DIAMNIOTIC, UNSPECIFIED TRIMESTER (HHS-HCC): ICD-10-CM

## 2023-12-28 PROCEDURE — 76816 OB US FOLLOW-UP PER FETUS: CPT

## 2023-12-28 PROCEDURE — 76819 FETAL BIOPHYS PROFIL W/O NST: CPT | Performed by: OBSTETRICS & GYNECOLOGY

## 2023-12-28 PROCEDURE — 76816 OB US FOLLOW-UP PER FETUS: CPT | Performed by: OBSTETRICS & GYNECOLOGY

## 2024-01-03 PROBLEM — O99.613 GASTROESOPHAGEAL REFLUX DURING PREGNANCY IN THIRD TRIMESTER, ANTEPARTUM (HHS-HCC): Status: ACTIVE | Noted: 2023-08-21

## 2024-01-03 PROBLEM — O30.043 DICHORIONIC DIAMNIOTIC TWIN PREGNANCY IN THIRD TRIMESTER (HHS-HCC): Status: ACTIVE | Noted: 2023-10-19

## 2024-01-03 PROBLEM — O16.3 HYPERTENSION AFFECTING PREGNANCY IN THIRD TRIMESTER (HHS-HCC): Status: ACTIVE | Noted: 2023-08-21

## 2024-01-03 PROBLEM — Z3A.33 33 WEEKS GESTATION OF PREGNANCY (HHS-HCC): Status: ACTIVE | Noted: 2023-08-21

## 2024-01-03 PROBLEM — O99.013 ANEMIA AFFECTING PREGNANCY IN THIRD TRIMESTER (HHS-HCC): Status: ACTIVE | Noted: 2023-12-21

## 2024-01-04 ENCOUNTER — ROUTINE PRENATAL (OUTPATIENT)
Dept: MATERNAL FETAL MEDICINE | Facility: CLINIC | Age: 24
End: 2024-01-04
Payer: COMMERCIAL

## 2024-01-04 VITALS — WEIGHT: 293 LBS | DIASTOLIC BLOOD PRESSURE: 80 MMHG | SYSTOLIC BLOOD PRESSURE: 136 MMHG | BODY MASS INDEX: 50.41 KG/M2

## 2024-01-04 DIAGNOSIS — O99.013 ANEMIA AFFECTING PREGNANCY IN THIRD TRIMESTER (HHS-HCC): ICD-10-CM

## 2024-01-04 DIAGNOSIS — Z3A.33 33 WEEKS GESTATION OF PREGNANCY (HHS-HCC): ICD-10-CM

## 2024-01-04 DIAGNOSIS — O30.043 DICHORIONIC DIAMNIOTIC TWIN PREGNANCY IN THIRD TRIMESTER (HHS-HCC): Primary | ICD-10-CM

## 2024-01-04 DIAGNOSIS — K21.9 GASTROESOPHAGEAL REFLUX DURING PREGNANCY IN THIRD TRIMESTER, ANTEPARTUM (HHS-HCC): ICD-10-CM

## 2024-01-04 DIAGNOSIS — O99.613 GASTROESOPHAGEAL REFLUX DURING PREGNANCY IN THIRD TRIMESTER, ANTEPARTUM (HHS-HCC): ICD-10-CM

## 2024-01-04 DIAGNOSIS — O16.3 HYPERTENSION AFFECTING PREGNANCY IN THIRD TRIMESTER (HHS-HCC): ICD-10-CM

## 2024-01-04 PROCEDURE — 99214 OFFICE O/P EST MOD 30 MIN: CPT | Mod: GC | Performed by: OBSTETRICS & GYNECOLOGY

## 2024-01-04 PROCEDURE — 99214 OFFICE O/P EST MOD 30 MIN: CPT | Performed by: STUDENT IN AN ORGANIZED HEALTH CARE EDUCATION/TRAINING PROGRAM

## 2024-01-04 PROCEDURE — RXMED WILLOW AMBULATORY MEDICATION CHARGE

## 2024-01-04 PROCEDURE — 87081 CULTURE SCREEN ONLY: CPT | Performed by: OBSTETRICS & GYNECOLOGY

## 2024-01-04 RX ORDER — FAMOTIDINE 20 MG/1
20 TABLET, FILM COATED ORAL 2 TIMES DAILY
Qty: 90 TABLET | Refills: 3 | Status: SHIPPED | OUTPATIENT
Start: 2024-01-04 | End: 2024-02-09 | Stop reason: HOSPADM

## 2024-01-04 ASSESSMENT — ENCOUNTER SYMPTOMS
MUSCULOSKELETAL NEGATIVE: 0
PSYCHIATRIC NEGATIVE: 0
RESPIRATORY NEGATIVE: 0
HEMATOLOGIC/LYMPHATIC NEGATIVE: 0
ENDOCRINE NEGATIVE: 0
EYES NEGATIVE: 0
ALLERGIC/IMMUNOLOGIC NEGATIVE: 0
GASTROINTESTINAL NEGATIVE: 0
NEUROLOGICAL NEGATIVE: 0
CARDIOVASCULAR NEGATIVE: 0
CONSTITUTIONAL NEGATIVE: 0

## 2024-01-04 NOTE — ASSESSMENT & PLAN NOTE
- Most recent growth: 12/28. Twin A) 1849g (24%), AC 58%, Breech. Twin B) 1831g (22%), AC 40%, Cephalic. Concordant growth   - Continue serial growths

## 2024-01-04 NOTE — PROGRESS NOTES
M Follow-up  2024         SUBJECTIVE    HPI: Ivanna Addison is a 23 y.o.  at 33w3d here for RPNV in the s/o a di/di twin pregnancy and cHTN (no meds).. Denies contractions, No vaginal bleeding, or LOF. Reports normal fetal movement of both babies. Patient reports feeling some vaginal shooting and spasms.     OBJECTIVE    Visit Vitals  /80 Comment: Pluse-103   Wt 137 kg (302 lb 14.4 oz)   LMP 2023   BMI 50.41 kg/m²   OB Status Pregnant   Smoking Status Never   BSA 2.51 m²        FHT: Baby A 153, Baby B 141    ASSESSMENT & PLAN    Ivanna Addison is a 23 y.o.  at 33w3d here for the following concerns we addressed today:    Hypertension affecting pregnancy in third trimester  - BP today: 136/80, keeps BP cuff at home, BP's wnl  - Denies HA, blurry vision, RUQ pain   - Well- controlled, no meds  - Continue  testing    Dichorionic diamniotic twin pregnancy in third trimester  - Most recent growth: . Twin A) 1849g (24%), AC 58%, Breech. Twin B) 1831g (22%), AC 40%, Cephalic. Concordant growth   - Continue serial growths, next scheduled     33 weeks gestation of pregnancy  - Doing well   - GBS collected today     Anemia affecting pregnancy in third trimester  - Most recent Hgb 10.2 ()  - Continue PO Fe       Orders Placed This Encounter   Procedures    Group B Streptococcus (GBS) Prenatal Screen, Culture     Order Specific Question:   Release result to [facility]     Answer:   Immediate [1]      RTC in 2 weeks    Patient seen and evaluated with Dr. Vandana Dowd MD PGY-3    I saw and evaluated the patient. I personally obtained the key and critical portions of the history and physical exam or was physically present for key and critical portions performed by the resident/fellow. I reviewed the resident/fellow's documentation and discussed the patient with the resident/fellow. I agree with the resident/fellow's medical decision making as documented in the note.      Briefly,  at 33w3d. Doing well, GERD symptoms remain uncontrolled. Will  pepcid that was previously prescribed. Normotensive. Discussed likely 37w delivery. Mode of delivery to be determined at next ultrasound (most recently twin A was breech). RTC in 2 weeks    MD BETI WattersM

## 2024-01-04 NOTE — ASSESSMENT & PLAN NOTE
- BP today: 136/80  - Denies HA, blurry vision, RUQ pain   - Well- controlled, no meds  - Continue  testing

## 2024-01-06 LAB — GP B STREP GENITAL QL CULT: ABNORMAL

## 2024-01-08 ENCOUNTER — HOSPITAL ENCOUNTER (OUTPATIENT)
Facility: HOSPITAL | Age: 24
Discharge: HOME | End: 2024-01-08
Attending: OBSTETRICS & GYNECOLOGY | Admitting: OBSTETRICS & GYNECOLOGY
Payer: COMMERCIAL

## 2024-01-08 ENCOUNTER — TELEPHONE (OUTPATIENT)
Dept: OBSTETRICS AND GYNECOLOGY | Facility: HOSPITAL | Age: 24
End: 2024-01-08
Payer: COMMERCIAL

## 2024-01-08 VITALS
HEIGHT: 67 IN | OXYGEN SATURATION: 99 % | HEART RATE: 91 BPM | RESPIRATION RATE: 16 BRPM | BODY MASS INDEX: 45.99 KG/M2 | WEIGHT: 293 LBS | SYSTOLIC BLOOD PRESSURE: 146 MMHG | TEMPERATURE: 98.8 F | DIASTOLIC BLOOD PRESSURE: 82 MMHG

## 2024-01-08 PROCEDURE — 99213 OFFICE O/P EST LOW 20 MIN: CPT | Performed by: OBSTETRICS & GYNECOLOGY

## 2024-01-08 RX ORDER — ONDANSETRON 4 MG/1
4 TABLET, FILM COATED ORAL EVERY 6 HOURS PRN
Status: DISCONTINUED | OUTPATIENT
Start: 2024-01-08 | End: 2024-01-08 | Stop reason: HOSPADM

## 2024-01-08 RX ORDER — LABETALOL HYDROCHLORIDE 5 MG/ML
20 INJECTION, SOLUTION INTRAVENOUS ONCE AS NEEDED
Status: DISCONTINUED | OUTPATIENT
Start: 2024-01-08 | End: 2024-01-08 | Stop reason: HOSPADM

## 2024-01-08 RX ORDER — ONDANSETRON HYDROCHLORIDE 2 MG/ML
4 INJECTION, SOLUTION INTRAVENOUS EVERY 6 HOURS PRN
Status: DISCONTINUED | OUTPATIENT
Start: 2024-01-08 | End: 2024-01-08 | Stop reason: HOSPADM

## 2024-01-08 RX ORDER — LIDOCAINE HYDROCHLORIDE 10 MG/ML
0.5 INJECTION INFILTRATION; PERINEURAL ONCE AS NEEDED
Status: DISCONTINUED | OUTPATIENT
Start: 2024-01-08 | End: 2024-01-08 | Stop reason: HOSPADM

## 2024-01-08 RX ORDER — HYDRALAZINE HYDROCHLORIDE 20 MG/ML
5 INJECTION INTRAMUSCULAR; INTRAVENOUS ONCE AS NEEDED
Status: DISCONTINUED | OUTPATIENT
Start: 2024-01-08 | End: 2024-01-08 | Stop reason: HOSPADM

## 2024-01-08 RX ORDER — NIFEDIPINE 10 MG/1
10 CAPSULE ORAL ONCE AS NEEDED
Status: DISCONTINUED | OUTPATIENT
Start: 2024-01-08 | End: 2024-01-08 | Stop reason: HOSPADM

## 2024-01-08 ASSESSMENT — PAIN SCALES - GENERAL
PAINLEVEL_OUTOF10: 0 - NO PAIN
PAINLEVEL_OUTOF10: 1

## 2024-01-08 NOTE — TELEPHONE ENCOUNTER
Was driving and began to have vomiting.  Was having chest tightness.  Having [place] Wheeler contractions, in her back.  Earlier, it was not that often. 1-2 per hour.  Like pressure. Chest tightness is bothering her, has never bothered her before.  Will come to L&D.

## 2024-01-09 ENCOUNTER — HOSPITAL ENCOUNTER (OUTPATIENT)
Facility: HOSPITAL | Age: 24
End: 2024-01-09
Attending: OBSTETRICS & GYNECOLOGY | Admitting: OBSTETRICS & GYNECOLOGY
Payer: COMMERCIAL

## 2024-01-09 ENCOUNTER — HOSPITAL ENCOUNTER (OUTPATIENT)
Facility: HOSPITAL | Age: 24
Discharge: HOME | End: 2024-01-09
Attending: OBSTETRICS & GYNECOLOGY | Admitting: OBSTETRICS & GYNECOLOGY
Payer: COMMERCIAL

## 2024-01-09 VITALS
DIASTOLIC BLOOD PRESSURE: 75 MMHG | SYSTOLIC BLOOD PRESSURE: 126 MMHG | HEIGHT: 67 IN | TEMPERATURE: 98.1 F | RESPIRATION RATE: 18 BRPM | BODY MASS INDEX: 45.99 KG/M2 | OXYGEN SATURATION: 100 % | HEART RATE: 75 BPM | WEIGHT: 293 LBS

## 2024-01-09 PROBLEM — O47.9 UTERINE CONTRACTIONS DURING PREGNANCY (HHS-HCC): Status: ACTIVE | Noted: 2024-01-09

## 2024-01-09 LAB
ALBUMIN SERPL BCP-MCNC: 3.1 G/DL (ref 3.4–5)
ALP SERPL-CCNC: 150 U/L (ref 33–110)
ALT SERPL W P-5'-P-CCNC: 29 U/L (ref 7–45)
ANION GAP SERPL CALC-SCNC: 13 MMOL/L (ref 10–20)
AST SERPL W P-5'-P-CCNC: 18 U/L (ref 9–39)
BILIRUB SERPL-MCNC: 0.3 MG/DL (ref 0–1.2)
BUN SERPL-MCNC: 7 MG/DL (ref 6–23)
CALCIUM SERPL-MCNC: 9.2 MG/DL (ref 8.6–10.6)
CHLORIDE SERPL-SCNC: 106 MMOL/L (ref 98–107)
CO2 SERPL-SCNC: 21 MMOL/L (ref 21–32)
CREAT SERPL-MCNC: 0.56 MG/DL (ref 0.5–1.05)
CREAT UR-MCNC: 198 MG/DL (ref 20–320)
EGFRCR SERPLBLD CKD-EPI 2021: >90 ML/MIN/1.73M*2
ERYTHROCYTE [DISTWIDTH] IN BLOOD BY AUTOMATED COUNT: 14.4 % (ref 11.5–14.5)
GLUCOSE SERPL-MCNC: 84 MG/DL (ref 74–99)
HCT VFR BLD AUTO: 30.5 % (ref 36–46)
HGB BLD-MCNC: 9.8 G/DL (ref 12–16)
LDH SERPL L TO P-CCNC: 198 U/L (ref 84–246)
MCH RBC QN AUTO: 25.5 PG (ref 26–34)
MCHC RBC AUTO-ENTMCNC: 32.1 G/DL (ref 32–36)
MCV RBC AUTO: 79 FL (ref 80–100)
NRBC BLD-RTO: 0 /100 WBCS (ref 0–0)
PLATELET # BLD AUTO: 272 X10*3/UL (ref 150–450)
POC APPEARANCE, URINE: CLEAR
POC BILIRUBIN, URINE: NEGATIVE
POC BLOOD, URINE: NEGATIVE
POC COLOR, URINE: YELLOW
POC GLUCOSE, URINE: NEGATIVE MG/DL
POC KETONES, URINE: NEGATIVE MG/DL
POC LEUKOCYTES, URINE: NEGATIVE
POC NITRITE,URINE: NEGATIVE
POC PH, URINE: 6 PH
POC PROTEIN, URINE: NEGATIVE MG/DL
POC SPECIFIC GRAVITY, URINE: >=1.03
POC UROBILINOGEN, URINE: 0.2 EU/DL
POTASSIUM SERPL-SCNC: 4 MMOL/L (ref 3.5–5.3)
PROT SERPL-MCNC: 6.4 G/DL (ref 6.4–8.2)
PROT UR-ACNC: 25 MG/DL (ref 5–24)
PROT/CREAT UR: 0.13 MG/MG CREAT (ref 0–0.17)
RBC # BLD AUTO: 3.84 X10*6/UL (ref 4–5.2)
SODIUM SERPL-SCNC: 136 MMOL/L (ref 136–145)
URATE SERPL-MCNC: 4.3 MG/DL (ref 2.3–6.7)
WBC # BLD AUTO: 6.5 X10*3/UL (ref 4.4–11.3)

## 2024-01-09 PROCEDURE — 36415 COLL VENOUS BLD VENIPUNCTURE: CPT

## 2024-01-09 PROCEDURE — 99213 OFFICE O/P EST LOW 20 MIN: CPT

## 2024-01-09 PROCEDURE — 85027 COMPLETE CBC AUTOMATED: CPT

## 2024-01-09 PROCEDURE — 80053 COMPREHEN METABOLIC PANEL: CPT

## 2024-01-09 PROCEDURE — 36415 COLL VENOUS BLD VENIPUNCTURE: CPT | Mod: 59

## 2024-01-09 PROCEDURE — 59025 FETAL NON-STRESS TEST: CPT

## 2024-01-09 PROCEDURE — 81002 URINALYSIS NONAUTO W/O SCOPE: CPT

## 2024-01-09 PROCEDURE — 84550 ASSAY OF BLOOD/URIC ACID: CPT

## 2024-01-09 PROCEDURE — 83615 LACTATE (LD) (LDH) ENZYME: CPT

## 2024-01-09 PROCEDURE — 82570 ASSAY OF URINE CREATININE: CPT

## 2024-01-09 PROCEDURE — 99215 OFFICE O/P EST HI 40 MIN: CPT | Mod: 25

## 2024-01-09 RX ORDER — LIDOCAINE HYDROCHLORIDE 10 MG/ML
0.5 INJECTION INFILTRATION; PERINEURAL ONCE AS NEEDED
Status: DISCONTINUED | OUTPATIENT
Start: 2024-01-09 | End: 2024-01-09 | Stop reason: HOSPADM

## 2024-01-09 RX ORDER — HYDRALAZINE HYDROCHLORIDE 20 MG/ML
5 INJECTION INTRAMUSCULAR; INTRAVENOUS ONCE AS NEEDED
Status: DISCONTINUED | OUTPATIENT
Start: 2024-01-09 | End: 2024-01-09 | Stop reason: HOSPADM

## 2024-01-09 RX ORDER — LABETALOL HYDROCHLORIDE 5 MG/ML
20 INJECTION, SOLUTION INTRAVENOUS ONCE AS NEEDED
Status: DISCONTINUED | OUTPATIENT
Start: 2024-01-09 | End: 2024-01-09 | Stop reason: HOSPADM

## 2024-01-09 RX ORDER — NIFEDIPINE 10 MG/1
10 CAPSULE ORAL ONCE AS NEEDED
Status: DISCONTINUED | OUTPATIENT
Start: 2024-01-09 | End: 2024-01-09 | Stop reason: HOSPADM

## 2024-01-09 SDOH — SOCIAL STABILITY: SOCIAL INSECURITY: HAS ANYONE EVER THREATENED TO HURT YOUR FAMILY OR YOUR PETS?: NO

## 2024-01-09 SDOH — SOCIAL STABILITY: SOCIAL INSECURITY: ARE THERE ANY APPARENT SIGNS OF INJURIES/BEHAVIORS THAT COULD BE RELATED TO ABUSE/NEGLECT?: NO

## 2024-01-09 SDOH — HEALTH STABILITY: MENTAL HEALTH: SUICIDAL BEHAVIOR (LIFETIME): NO

## 2024-01-09 SDOH — HEALTH STABILITY: MENTAL HEALTH: HAVE YOU USED ANY SUBSTANCES (CANABIS, COCAINE, HEROIN, HALLUCINOGENS, INHALANTS, ETC.) IN THE PAST 12 MONTHS?: NO

## 2024-01-09 SDOH — HEALTH STABILITY: MENTAL HEALTH: WISH TO BE DEAD (PAST 1 MONTH): NO

## 2024-01-09 SDOH — ECONOMIC STABILITY: HOUSING INSECURITY: DO YOU FEEL UNSAFE GOING BACK TO THE PLACE WHERE YOU ARE LIVING?: NO

## 2024-01-09 SDOH — SOCIAL STABILITY: SOCIAL INSECURITY: DOES ANYONE TRY TO KEEP YOU FROM HAVING/CONTACTING OTHER FRIENDS OR DOING THINGS OUTSIDE YOUR HOME?: NO

## 2024-01-09 SDOH — SOCIAL STABILITY: SOCIAL INSECURITY: ARE YOU OR HAVE YOU BEEN THREATENED OR ABUSED PHYSICALLY, EMOTIONALLY, OR SEXUALLY BY ANYONE?: NO

## 2024-01-09 SDOH — HEALTH STABILITY: MENTAL HEALTH: NON-SPECIFIC ACTIVE SUICIDAL THOUGHTS (PAST 1 MONTH): NO

## 2024-01-09 SDOH — SOCIAL STABILITY: SOCIAL INSECURITY: VERBAL ABUSE: DENIES

## 2024-01-09 SDOH — HEALTH STABILITY: MENTAL HEALTH: WERE YOU ABLE TO COMPLETE ALL THE BEHAVIORAL HEALTH SCREENINGS?: YES

## 2024-01-09 SDOH — HEALTH STABILITY: MENTAL HEALTH: STRENGTHS (MUST CHOOSE TWO): SUPPORT FROM FAMILY;SUPPORT FROM FRIENDS

## 2024-01-09 SDOH — SOCIAL STABILITY: SOCIAL INSECURITY: PHYSICAL ABUSE: DENIES

## 2024-01-09 SDOH — HEALTH STABILITY: MENTAL HEALTH: HAVE YOU USED ANY PRESCRIPTION DRUGS OTHER THAN PRESCRIBED IN THE PAST 12 MONTHS?: NO

## 2024-01-09 SDOH — SOCIAL STABILITY: SOCIAL INSECURITY: HAVE YOU HAD THOUGHTS OF HARMING ANYONE ELSE?: NO

## 2024-01-09 SDOH — SOCIAL STABILITY: SOCIAL INSECURITY: ABUSE SCREEN: ADULT

## 2024-01-09 SDOH — SOCIAL STABILITY: SOCIAL INSECURITY: DO YOU FEEL ANYONE HAS EXPLOITED OR TAKEN ADVANTAGE OF YOU FINANCIALLY OR OF YOUR PERSONAL PROPERTY?: NO

## 2024-01-09 ASSESSMENT — LIFESTYLE VARIABLES
HOW OFTEN DO YOU HAVE A DRINK CONTAINING ALCOHOL: NEVER
HOW OFTEN DO YOU HAVE 6 OR MORE DRINKS ON ONE OCCASION: NEVER
AUDIT-C TOTAL SCORE: 0
AUDIT-C TOTAL SCORE: 0
SKIP TO QUESTIONS 9-10: 1
HOW MANY STANDARD DRINKS CONTAINING ALCOHOL DO YOU HAVE ON A TYPICAL DAY: PATIENT DOES NOT DRINK

## 2024-01-09 ASSESSMENT — PATIENT HEALTH QUESTIONNAIRE - PHQ9
SUM OF ALL RESPONSES TO PHQ9 QUESTIONS 1 & 2: 0
2. FEELING DOWN, DEPRESSED OR HOPELESS: NOT AT ALL
1. LITTLE INTEREST OR PLEASURE IN DOING THINGS: NOT AT ALL

## 2024-01-09 ASSESSMENT — PAIN SCALES - GENERAL: PAINLEVEL_OUTOF10: 4

## 2024-01-09 NOTE — H&P
Obstetrical Admission History and Physical     Ivanna Addison is a 23 y.o.  at 34.1 wga with di-di twins by 12 week US presenting to OB TRIAGE for contractions    Chief Complaint: Vaginal Pressure and Contractions    Assessment/Plan      R/o PTL  - SVE 0/20/-3  - Espino: no ctx, mild irritability  - urine dip s/f SG > 1.030  - PO fluids in OB triage  - return precautions reviewed, verbalizes understanding    R/o PEC  - Baseline -130s in clinic, now 140-150s in OB triage  - BP 120s at home, taken 1x/week  - asymptomatic  - HELLP labs negative, P:C 0.13  - Discussed taking BP BID and s&s of preeclampsia  - return precautions reviewed, patient verbalizes understanding  - follow-up in 1 week for prenatal care appt    Anemia  - hgb 10.2--> 9.8  - [facility] staff messaged to schedule patient for IV Fe    IUP di-di- twins at 34.1 wga  - Twin A: NST reactive Twin B: NST reactive  - Good fetal movement  - Continue routine prenatal care  - Next appt to be scheduled with [facility] staff, message sent    Maternal Well-being  - All questions and concerns addressed     Dispo  - patient appropriate for d/c home, agrees with plan  - f/u at next scheduled OB appt or to triage sooner as needed     Plan and tracing reviewed with Dr. Maicol Victoria, APRN-CNP     Active Problems:    Uterine contractions during pregnancy      Pregnancy Problems (from 21 to present)       Problem Noted Resolved    Anemia affecting pregnancy in third trimester 2023 by Yady Boothe MD No    Priority:  Medium      Overview Signed 2023  2:26 PM by Yady Boothe MD     Anemia hgb 10.2, ferritin 13. Rx'd PO iron         Pelvic pain in pregnancy 2023 by Emili Roche MD No    Priority:  Medium      Overview Signed 2023 12:50 PM by Meggan Morel MD     Pregnancy support belt          Dichorionic diamniotic twin pregnancy in third trimester 10/19/2023 by Fabiola Munoz MD No    Priority:  Medium       Overview Addendum 2023 12:51 PM by Meggan Morel MD     [ ] serial growths beginning 24-28 weeks  [ ] delivery 37-38 weeks         Gastroesophageal reflux during pregnancy in third trimester, antepartum 2023 by Varinder Rae No    Priority:  Medium      Overview Addendum 2023 12:06 PM by Emili Roche MD     Takes Tums when it bothers her.  Pepcid added         Prolapse of female pelvic organs 2023 by Varinder Rae No    Priority:  Medium      Overview Addendum 2023 12:06 PM by Emili Roche MD     Patient reports hx of intermittent pelvic organ prolapse during pregnancy, beginning in  pregnancy. Went on to have  x3.   - Occurs during Valsalva, usually with constipation. Previously instructed to manually reduce.   - Pt has regular soft BM's now, has miralax to use prn   - pelvic floor therapy; previously declined pessary placement per patient   [ x] Urogynecology apt  -Discussed following up postpartum for any corrective surgery. Has appt scheduled 2024         33 weeks gestation of pregnancy 2023 by Varinder Rae No    Priority:  Medium      Overview Addendum 2024  6:26 PM by Jesus Ross MD     [x] labs uptodate  [X] Anatomy US  [X] 2nd tri labs, hgb 10.2  [X] 1 hr gtt  -wnl  [X] tdap   [x] flu 11/  [x] declined covid  [x] GBS: positive  [ ] ppBC: ppIUD  [ ] breast feeding, for pump rx   [ ] Delivery plannin-38 weeks, A breech           Hypertension affecting pregnancy in third trimester 2023 by Varinder Rae No    Priority:  Medium      Overview Addendum 2023 12:50 PM by Meggan Morel MD     [x] Baseline HELLP Labs, P:C 0.05  [X] bASA  [ ]  Testing  [ ] Echo vs EKG    Serial Growths    Was on nifedipine 30 prior to pregnancy, no meds currently                  Subjective   Ivanna is here complaining of contractions, a few/hour. Good fetal movement. Denies vaginal bleeding., Denies leaking of fluid.       Patient has been having contractions felt in her pelvis since last night. Last night her contractions were stronger than today. Was able to sleep. She is not timing them, but she feels a few an hour. She rates them 5/10. Vaginal pressure is not increased from baseline.    Blood pressures have been 120s systolic at home, taking them 1x/week. Denies HA, SOB, RUQ pain, vision changes.      Obstetrical History   OB History    Para Term  AB Living   4 3 3     3   SAB IAB Ectopic Multiple Live Births           3      # Outcome Date GA Lbr Isidoro/2nd Weight Sex Delivery Anes PTL Lv   4 Current            3 Term 22 38w1d  2948 g F Vag-Spont EPI N ARSEN   2 Term 20 39w0d  3260 g M Vag-Spont EPI N ARSEN   1 Term 18 39w6d  3714 g F Vag-Spont EPI N ARSEN       Past Medical History  Past Medical History:   Diagnosis Date    Chronic hypertension     Eczema         Past Surgical History   Past Surgical History:   Procedure Laterality Date    CHOLECYSTECTOMY N/A        Social History  Social History     Tobacco Use    Smoking status: Never    Smokeless tobacco: Not on file   Substance Use Topics    Alcohol use: Never     Substance and Sexual Activity   Drug Use Never       Allergies  Patient has no known allergies.     Medications  Medications Prior to Admission   Medication Sig Dispense Refill Last Dose    aspirin 81 mg EC tablet Take 2 tablets (162 mg) by mouth once daily. 180 tablet 2     blood pressure test kit-large kit 1 kit 2 times a day. 1 each 0     blood pressure test kit-large kit 1 each once daily. 1 each 0     blood pressure test kit-wrist kit Use once daily and record your blood pressure       diphenhydrAMINE (Unisom SleepMinis) 25 mg capsule Take 1 capsule (25 mg) by mouth once daily at bedtime. With vitamin B-6 for nausea and vomiting       doxylamine (Unisom) 25 mg tablet TAKE 1 TABLET ONCE DAILY AT BEDTIME ALONG WITH VITAMIN B6 FOR NAUSEA AND VOMITING 32 tablet 3     famotidine  (Pepcid) 20 mg tablet Take 1 tablet (20 mg) by mouth 2 times a day. 90 tablet 3     ferrous gluconate (Fergon) 324 (38 Fe) mg tablet Take 1 tablet (38 mg of iron) by mouth every other day. 45 tablet 3     -iron fum-folic acid 29 mg iron- 1 mg tablet TAKE 1 TABLET DAILY. 90 tablet 3     pyridoxine (Vitamin B-6) 25 mg tablet TAKE 1 TABLET BY MOUTH EVERY 6 HOURS 120 tablet 3        Objective    Last Vitals  Temp Pulse Resp BP MAP O2 Sat   36.7 °C (98.1 °F) 67 18 (!) 153/79   100 %     Physical Examination  Twin A: FHR is  135, with Accelerations, and a  category I tracing.   Twin B: FHR is 145 , with Accelerations, and a category I  tracing.    Rittman reading:    CERVIX: Closed cm dilated, 20 % effaced, -3 station; MEMBRANES are Intact  General: Examination reveals a well developed, well nourished, female, in no acute distress. She is alert and cooperative.  Lungs: symmetrical, non-labored breathing.  Cardiac: warm, well-perfused.  Abdomen: soft, non-tender, gravid  Extremities: no redness or tenderness in the calves or thighs.  Neurological: alert, oriented, normal speech, no focal findings or movement disorder noted.     Twin A: Non-Stress Test   Baseline Fetal Heart Rate for Non-Stress Test: 135 BPM  Variability in Waveform for Non-Stress Test: Moderate  Accelerations in Non-Stress Test: Yes, lasting at least 15 seconds, greater than/equal to 15 bpm  Decelerations in Non-Stress Test: None  Contractions in Non-Stress Test: Not present  Acoustic Stimulator for Non-Stress Test: No  Interpretation of Non-Stress Test   Interpretation of Non-Stress Test: Reactive  NST for Multiple Fetuses: Yes   Twin B: Non-Stress Test:  Baseline Fetal Heart Rate for Non-Stress Test: 145 BPM  Variability in Waveform for Non-Stress Test: Moderate  Accelerations in Non-Stress Test: Yes, lasting at least 15 seconds, greater than/equal to 15 bpm  Decelerations in Non-Stress Test: None  Contractions in Non-Stress Test: Not  present  Acoustic Stimulator for Non-Stress Test: No  Interpretation of Non-Stress Test   Interpretation of Non-Stress Test: Reactive  NST for Multiple Fetuses: Yes     Lab Review  Admission on 01/09/2024   Component Date Value Ref Range Status    WBC 01/09/2024 6.5  4.4 - 11.3 x10*3/uL Final    nRBC 01/09/2024 0.0  0.0 - 0.0 /100 WBCs Final    RBC 01/09/2024 3.84 (L)  4.00 - 5.20 x10*6/uL Final    Hemoglobin 01/09/2024 9.8 (L)  12.0 - 16.0 g/dL Final    Hematocrit 01/09/2024 30.5 (L)  36.0 - 46.0 % Final    MCV 01/09/2024 79 (L)  80 - 100 fL Final    MCH 01/09/2024 25.5 (L)  26.0 - 34.0 pg Final    MCHC 01/09/2024 32.1  32.0 - 36.0 g/dL Final    RDW 01/09/2024 14.4  11.5 - 14.5 % Final    Platelets 01/09/2024 272  150 - 450 x10*3/uL Final    Glucose 01/09/2024 84  74 - 99 mg/dL Final    Sodium 01/09/2024 136  136 - 145 mmol/L Final    Potassium 01/09/2024 4.0  3.5 - 5.3 mmol/L Final    Chloride 01/09/2024 106  98 - 107 mmol/L Final    Bicarbonate 01/09/2024 21  21 - 32 mmol/L Final    Anion Gap 01/09/2024 13  10 - 20 mmol/L Final    Urea Nitrogen 01/09/2024 7  6 - 23 mg/dL Final    Creatinine 01/09/2024 0.56  0.50 - 1.05 mg/dL Final    eGFR 01/09/2024 >90  >60 mL/min/1.73m*2 Final    Calculations of estimated GFR are performed using the 2021 CKD-EPI Study Refit equation without the race variable for the IDMS-Traceable creatinine methods.  https://jasn.asnjournals.org/content/early/2021/09/22/ASN.2953716143    Calcium 01/09/2024 9.2  8.6 - 10.6 mg/dL Final    Albumin 01/09/2024 3.1 (L)  3.4 - 5.0 g/dL Final    Alkaline Phosphatase 01/09/2024 150 (H)  33 - 110 U/L Final    Total Protein 01/09/2024 6.4  6.4 - 8.2 g/dL Final    AST 01/09/2024 18  9 - 39 U/L Final    Bilirubin, Total 01/09/2024 0.3  0.0 - 1.2 mg/dL Final    ALT 01/09/2024 29  7 - 45 U/L Final    Patients treated with Sulfasalazine may generate falsely decreased results for ALT.    Uric Acid 01/09/2024 4.3  2.3 - 6.7 mg/dL Final    Venipuncture  immediately after or during the administration of Metamizole may lead to falsely low results. Testing should be performed immediately  prior to Metamizole dosing.    LDH 01/09/2024 198  84 - 246 U/L Final    Total Protein, Urine Random 01/09/2024 25 (H)  5 - 24 mg/dL Final    Creatinine, Urine Random 01/09/2024 198.0  20.0 - 320.0 mg/dL Final    T. Protein/Creatinine Ratio 01/09/2024 0.13  0.00 - 0.17 mg/mg Creat Final    POC Color, Urine 01/09/2024 Yellow  Straw, Yellow, Light-Yellow Final    POC Appearance, Urine 01/09/2024 Clear  Clear Final    POC Glucose, Urine 01/09/2024 NEGATIVE  NEGATIVE mg/dl Final    POC Bilirubin, Urine 01/09/2024 NEGATIVE  NEGATIVE Final    POC Ketones, Urine 01/09/2024 NEGATIVE  NEGATIVE mg/dl Final    POC Specific Gravity, Urine 01/09/2024 >=1.030  1.005 - 1.035 Final    POC Blood, Urine 01/09/2024 NEGATIVE  NEGATIVE Final    POC PH, Urine 01/09/2024 6.0  No Reference Range Established PH Final    POC Protein, Urine 01/09/2024 NEGATIVE  NEGATIVE, 30 (1+) mg/dl Final    POC Urobilinogen, Urine 01/09/2024 0.2  0.2, 1.0 EU/DL Final    Poc Nitrite, Urine 01/09/2024 NEGATIVE  NEGATIVE Final    POC Leukocytes, Urine 01/09/2024 NEGATIVE  NEGATIVE Final

## 2024-01-09 NOTE — H&P
Obstetrical Admission History and Physical     Ivanna Addison is a 23 y.o. . Triage    Chief Complaint: No chief complaint on file.    Assessment/Plan    IUP 34 weeks  DI DI twions  Hypertention  R/O LOF  -not ruptured  -NST reactivex2    Active Problems:  There are no active Hospital Problems.      Pregnancy Problems (from 21 to present)       Problem Noted Resolved    Anemia affecting pregnancy in third trimester 2023 by Yady Boothe MD No    Priority:  Medium      Overview Signed 2023  2:26 PM by Yady Boothe MD     Anemia hgb 10.2, ferritin 13. Rx'd PO iron         Pelvic pain in pregnancy 2023 by Emili Roche MD No    Priority:  Medium      Overview Signed 2023 12:50 PM by Meggan Morel MD     Pregnancy support belt          Dichorionic diamniotic twin pregnancy in third trimester 10/19/2023 by Fabiola Munoz MD No    Priority:  Medium      Overview Addendum 2023 12:51 PM by Meggan Morel MD     [ ] serial growths beginning 24-28 weeks  [ ] delivery 37-38 weeks         Gastroesophageal reflux during pregnancy in third trimester, antepartum 2023 by Varinder Rae No    Priority:  Medium      Overview Addendum 2023 12:06 PM by Emili Roche MD     Takes Tums when it bothers her.  Pepcid added         Prolapse of female pelvic organs 2023 by Varinder Rae No    Priority:  Medium      Overview Addendum 2023 12:06 PM by Emili Roche MD     Patient reports hx of intermittent pelvic organ prolapse during pregnancy, beginning in  pregnancy. Went on to have  x3.   - Occurs during Valsalva, usually with constipation. Previously instructed to manually reduce.   - Pt has regular soft BM's now, has miralax to use prn   - pelvic floor therapy; previously declined pessary placement per patient   [ x] Urogynecology apt  -Discussed following up postpartum for any corrective surgery. Has appt scheduled 2024         33  weeks gestation of pregnancy 2023 by Varinder Rae No    Priority:  Medium      Overview Addendum 2024  6:26 PM by Jesus Ross MD     [x] labs uptodate  [X] Anatomy US  [X] 2nd tri labs, hgb 10.2  [X] 1 hr gtt  -wnl  [X] tdap   [x] flu 11/  [x] declined covid  [x] GBS: positive  [ ] ppBC: ppIUD  [ ] breast feeding, for pump rx   [ ] Delivery plannin-38 weeks, A breech           Hypertension affecting pregnancy in third trimester 2023 by Varinder Rae No    Priority:  Medium      Overview Addendum 2023 12:50 PM by Meggan Morel MD     [x] Baseline HELLP Labs, P:C 0.05  [X] bASA  [ ]  Testing  [ ] Echo vs EKG    Serial Growths    Was on nifedipine 30 prior to pregnancy, no meds currently                  Subjective   Ivanna is here complaining of Leakage of fluid. Good fetal movement. Denies vaginal bleeding.    23-year-old G4, P3 at 34 weeks with a twin gestation presents for rule out rupture of membranes.  States she felt that earlier today after an episode of emesis.  She has not had any leakage since.  She has a history of chronic hypertension but otherwise is on eventful pregnancy.  She has a history of preeclampsia in her prior pregnancy.     Obstetrical History   OB History    Para Term  AB Living   4 3 3     3   SAB IAB Ectopic Multiple Live Births           3      # Outcome Date GA Lbr Isidoro/2nd Weight Sex Delivery Anes PTL Lv   4 Current            3 Term 22 38w1d  2948 g F Vag-Spont EPI N ARSEN   2 Term 20 39w0d  3260 g M Vag-Spont EPI N ARSEN   1 Term 18 39w6d  3714 g F Vag-Spont EPI N ARSEN       Past Medical History  Past Medical History:   Diagnosis Date    Chronic hypertension     Eczema         Past Surgical History   Past Surgical History:   Procedure Laterality Date    CHOLECYSTECTOMY N/A        Social History  Social History     Tobacco Use    Smoking status: Never    Smokeless tobacco: Not on file   Substance Use  Topics    Alcohol use: Never     Substance and Sexual Activity   Drug Use Never       Allergies  Patient has no known allergies.     Medications  Medications Prior to Admission   Medication Sig Dispense Refill Last Dose    aspirin 81 mg EC tablet Take 2 tablets (162 mg) by mouth once daily. 180 tablet 2     blood pressure test kit-large kit 1 kit 2 times a day. 1 each 0     blood pressure test kit-large kit 1 each once daily. 1 each 0     blood pressure test kit-wrist kit Use once daily and record your blood pressure       diphenhydrAMINE (Unisom SleepMinis) 25 mg capsule Take 1 capsule (25 mg) by mouth once daily at bedtime. With vitamin B-6 for nausea and vomiting       doxylamine (Unisom) 25 mg tablet TAKE 1 TABLET ONCE DAILY AT BEDTIME ALONG WITH VITAMIN B6 FOR NAUSEA AND VOMITING 32 tablet 3     famotidine (Pepcid) 20 mg tablet Take 1 tablet (20 mg) by mouth 2 times a day. 90 tablet 3     ferrous gluconate (Fergon) 324 (38 Fe) mg tablet Take 1 tablet (38 mg of iron) by mouth every other day. 45 tablet 3     -iron fum-folic acid 29 mg iron- 1 mg tablet TAKE 1 TABLET DAILY. 90 tablet 3     pyridoxine (Vitamin B-6) 25 mg tablet TAKE 1 TABLET BY MOUTH EVERY 6 HOURS 120 tablet 3        Objective    Last Vitals  Temp Pulse Resp BP MAP O2 Sat   37.1 °C (98.8 °F) 91 16 (!) 146/82   99 %     Physical Examination  GENERAL: Examination reveals a well developed, well nourished, gravid female in no acute distress. She is alert and cooperative.  HEENT: PERRLA. External ears normal. Nose normal, no erythema or discharge. Mouth and throat clear.  NECK: supple, no significant adenopathy  ABDOMEN: soft, gravid, nontender, nondistended, no abnormal masses, no epigastric pain  FHR is  , with  , and a   tracing.    Garrett reading:    VAGINA: normal appearing vagina with normal color and discharge and no lesions noted  CERVIX: closed and thick and SSE with no pooling and nit neg ; MEMBRANES are Intact  EXTREMITIES: no redness  "or tenderness in the calves or thighs, no edema    Lab Review  No results found for: \"ABO\", \"LABRH\", \"ABSCRN\"  No results found for: \"WBC\", \"HGB\", \"HCT\", \"PLT\"    "

## 2024-01-17 PROBLEM — Z3A.35 35 WEEKS GESTATION OF PREGNANCY (HHS-HCC): Status: ACTIVE | Noted: 2023-08-21

## 2024-01-18 ENCOUNTER — ROUTINE PRENATAL (OUTPATIENT)
Dept: MATERNAL FETAL MEDICINE | Facility: CLINIC | Age: 24
End: 2024-01-18
Payer: COMMERCIAL

## 2024-01-18 VITALS — BODY MASS INDEX: 47.82 KG/M2 | SYSTOLIC BLOOD PRESSURE: 131 MMHG | WEIGHT: 293 LBS | DIASTOLIC BLOOD PRESSURE: 88 MMHG

## 2024-01-18 DIAGNOSIS — Z3A.35 35 WEEKS GESTATION OF PREGNANCY (HHS-HCC): ICD-10-CM

## 2024-01-18 DIAGNOSIS — O30.043 DICHORIONIC DIAMNIOTIC TWIN PREGNANCY IN THIRD TRIMESTER (HHS-HCC): ICD-10-CM

## 2024-01-18 DIAGNOSIS — O99.613 GASTROESOPHAGEAL REFLUX DURING PREGNANCY IN THIRD TRIMESTER, ANTEPARTUM (HHS-HCC): Primary | ICD-10-CM

## 2024-01-18 DIAGNOSIS — K21.9 GASTROESOPHAGEAL REFLUX DURING PREGNANCY IN THIRD TRIMESTER, ANTEPARTUM (HHS-HCC): Primary | ICD-10-CM

## 2024-01-18 DIAGNOSIS — O16.3 HYPERTENSION AFFECTING PREGNANCY IN THIRD TRIMESTER (HHS-HCC): ICD-10-CM

## 2024-01-18 DIAGNOSIS — O99.013 ANEMIA AFFECTING PREGNANCY IN THIRD TRIMESTER (HHS-HCC): ICD-10-CM

## 2024-01-18 PROCEDURE — 99214 OFFICE O/P EST MOD 30 MIN: CPT | Performed by: OBSTETRICS & GYNECOLOGY

## 2024-01-18 PROCEDURE — 99214 OFFICE O/P EST MOD 30 MIN: CPT | Mod: GC | Performed by: OBSTETRICS & GYNECOLOGY

## 2024-01-18 ASSESSMENT — ENCOUNTER SYMPTOMS
EYES NEGATIVE: 0
RESPIRATORY NEGATIVE: 0
CARDIOVASCULAR NEGATIVE: 0
NEUROLOGICAL NEGATIVE: 0
ALLERGIC/IMMUNOLOGIC NEGATIVE: 0
HEMATOLOGIC/LYMPHATIC NEGATIVE: 0
GASTROINTESTINAL NEGATIVE: 0
MUSCULOSKELETAL NEGATIVE: 0
CONSTITUTIONAL NEGATIVE: 0
ENDOCRINE NEGATIVE: 0
PSYCHIATRIC NEGATIVE: 0

## 2024-01-18 NOTE — ASSESSMENT & PLAN NOTE
- Growth (12/28): Twin A EFW 1849 (24%), AC (58%), breech. Twin B EFW 1831g (22%), AC (40%), cephalic; concordant growth  - For repeat US next week, 1/25 scheduled today

## 2024-01-18 NOTE — PROGRESS NOTES
MFM Follow-up  2024         SUBJECTIVE    HPI: Ivanna Addison is a 23 y.o.  at 35w3d here for RPNV. Denies contractions, bleeding, or LOF. Reports normal fetal movement for both babies. Patient overall feeling well today. Denies HA, RUQ pain, blurry vision, CP or SOB.    OBJECTIVE    Visit Vitals  /88   Wt 138 kg (305 lb 4.8 oz)   LMP 2023   BMI 47.82 kg/m²   OB Status Pregnant   Smoking Status Never   BSA 2.55 m²        FHT: Twin A 136, Twin B 140    ASSESSMENT & PLAN    Ivanna Addison is a 23 y.o.  at 35w3d here for the following concerns we addressed today:    Hypertension affecting pregnancy in third trimester  -/88  -Continuing BID BP home checks, have been mild range overall  -si/sx sPEC discussed with patient today, encouraged to present to triage if there are any concerns.    35 weeks gestation of pregnancy  - PNL reviewed and UTD  - Pt notes decreased fetal movement in past 2-3 days  - Plan for  primary C section at 37 weeks for breech presentation of Baby A  - Depo for ppBC    Anemia affecting pregnancy in third trimester  - Most recent Hgb, : 9.8.  -Patient continues to take her PO iron without incrementation of her Hgb, will schedule for IV iron infusion  -Reports pica (Ice)      Dichorionic diamniotic twin pregnancy in third trimester  - Growth (): Twin A EFW 1849 (24%), AC (58%), breech. Twin B EFW 1831g (22%), AC (40%), cephalic; concordant growth  - For repeat US next week,  scheduled today    Gastroesophageal reflux during pregnancy in third trimester, antepartum  Reports improvement in GERD but worse when she drinks orange juice which she takes her iron with. Discussed avoiding OJ and instead using a squeeze of lemon in water or taking vitamin C with iron.  -Continue Pepcid, Tums PRN, improving.     No orders of the defined types were placed in this encounter.       RTC in 1 weeks    Patient seen and d/w Dr. Vandana Dowd MD

## 2024-01-18 NOTE — ASSESSMENT & PLAN NOTE
Reports improvement in GERD but worse when she drinks orange juice which she takes her iron with. Discussed avoiding OJ and instead using a squeeze of lemon in water or taking vitamin C with iron.  -Continue Pepcid, Tums PRN, improving.

## 2024-01-18 NOTE — ASSESSMENT & PLAN NOTE
- PNL reviewed and UTD  - Pt notes decreased fetal movement in past 2-3 days  - Plan for  primary C section at 37 weeks for breech presentation of Baby A  - Depo for ppBC

## 2024-01-18 NOTE — ASSESSMENT & PLAN NOTE
-/88  -Continuing BID BP home checks, have been mild range overall  -si/sx sPEC discussed with patient today, encouraged to present to triage if there are any concerns.

## 2024-01-18 NOTE — ASSESSMENT & PLAN NOTE
- Most recent Hgb, 1/9: 9.8.  -Patient continues to take her PO iron without incrementation of her Hgb, will schedule for IV iron infusion  -Reports pica (Ice)

## 2024-01-19 ENCOUNTER — TELEPHONE (OUTPATIENT)
Dept: OBSTETRICS AND GYNECOLOGY | Facility: CLINIC | Age: 24
End: 2024-01-19
Payer: COMMERCIAL

## 2024-01-19 DIAGNOSIS — O99.013 ANEMIA AFFECTING PREGNANCY IN THIRD TRIMESTER (HHS-HCC): ICD-10-CM

## 2024-01-19 RX ORDER — ALBUTEROL SULFATE 0.83 MG/ML
3 SOLUTION RESPIRATORY (INHALATION) AS NEEDED
Status: CANCELLED | OUTPATIENT
Start: 2024-01-19

## 2024-01-19 RX ORDER — FAMOTIDINE 10 MG/ML
20 INJECTION INTRAVENOUS ONCE AS NEEDED
Status: CANCELLED | OUTPATIENT
Start: 2024-01-19

## 2024-01-19 RX ORDER — DIPHENHYDRAMINE HYDROCHLORIDE 50 MG/ML
50 INJECTION INTRAMUSCULAR; INTRAVENOUS AS NEEDED
Status: CANCELLED | OUTPATIENT
Start: 2024-01-19

## 2024-01-19 RX ORDER — EPINEPHRINE 0.3 MG/.3ML
0.3 INJECTION SUBCUTANEOUS EVERY 5 MIN PRN
Status: CANCELLED | OUTPATIENT
Start: 2024-01-19

## 2024-01-19 NOTE — TELEPHONE ENCOUNTER
Called patient, identified by name and , to schedule recommended NST for decreased fetal movement   Patient unable to come today 24 due to the weather   NST scheduled for 24  Reviewed when to go to L&D triage, patient verbalized understanding.    BETO Tenorio RN

## 2024-01-23 ENCOUNTER — PREP FOR PROCEDURE (OUTPATIENT)
Dept: OBSTETRICS AND GYNECOLOGY | Facility: CLINIC | Age: 24
End: 2024-01-23

## 2024-01-23 ENCOUNTER — INFUSION (OUTPATIENT)
Dept: INFUSION THERAPY | Facility: HOSPITAL | Age: 24
End: 2024-01-23
Payer: COMMERCIAL

## 2024-01-23 VITALS
WEIGHT: 293 LBS | TEMPERATURE: 98.4 F | SYSTOLIC BLOOD PRESSURE: 126 MMHG | HEART RATE: 73 BPM | RESPIRATION RATE: 17 BRPM | BODY MASS INDEX: 48.08 KG/M2 | OXYGEN SATURATION: 97 % | DIASTOLIC BLOOD PRESSURE: 77 MMHG

## 2024-01-23 DIAGNOSIS — O30.043 DICHORIONIC DIAMNIOTIC TWIN PREGNANCY IN THIRD TRIMESTER (HHS-HCC): ICD-10-CM

## 2024-01-23 DIAGNOSIS — O26.899 PELVIC PAIN IN PREGNANCY (HHS-HCC): ICD-10-CM

## 2024-01-23 DIAGNOSIS — R10.2 PELVIC PAIN IN PREGNANCY (HHS-HCC): ICD-10-CM

## 2024-01-23 DIAGNOSIS — O99.613 GASTROESOPHAGEAL REFLUX DURING PREGNANCY IN THIRD TRIMESTER, ANTEPARTUM (HHS-HCC): ICD-10-CM

## 2024-01-23 DIAGNOSIS — O99.013 ANEMIA AFFECTING PREGNANCY IN THIRD TRIMESTER (HHS-HCC): ICD-10-CM

## 2024-01-23 DIAGNOSIS — K21.9 GASTROESOPHAGEAL REFLUX DURING PREGNANCY IN THIRD TRIMESTER, ANTEPARTUM (HHS-HCC): ICD-10-CM

## 2024-01-23 DIAGNOSIS — Z3A.35 35 WEEKS GESTATION OF PREGNANCY (HHS-HCC): ICD-10-CM

## 2024-01-23 DIAGNOSIS — O16.3 HYPERTENSION AFFECTING PREGNANCY IN THIRD TRIMESTER (HHS-HCC): ICD-10-CM

## 2024-01-23 PROCEDURE — 96365 THER/PROPH/DIAG IV INF INIT: CPT

## 2024-01-23 PROCEDURE — 2500000004 HC RX 250 GENERAL PHARMACY W/ HCPCS (ALT 636 FOR OP/ED): Performed by: OBSTETRICS & GYNECOLOGY

## 2024-01-23 PROCEDURE — 96366 THER/PROPH/DIAG IV INF ADDON: CPT

## 2024-01-23 RX ORDER — ALBUTEROL SULFATE 0.83 MG/ML
3 SOLUTION RESPIRATORY (INHALATION) AS NEEDED
OUTPATIENT
Start: 2024-01-27

## 2024-01-23 RX ORDER — FAMOTIDINE 10 MG/ML
20 INJECTION INTRAVENOUS ONCE AS NEEDED
OUTPATIENT
Start: 2024-01-27

## 2024-01-23 RX ORDER — EPINEPHRINE 0.3 MG/.3ML
0.3 INJECTION SUBCUTANEOUS EVERY 5 MIN PRN
OUTPATIENT
Start: 2024-01-27

## 2024-01-23 RX ORDER — ALBUTEROL SULFATE 0.83 MG/ML
3 SOLUTION RESPIRATORY (INHALATION) AS NEEDED
Status: DISCONTINUED | OUTPATIENT
Start: 2024-01-23 | End: 2024-01-24 | Stop reason: HOSPADM

## 2024-01-23 RX ORDER — EPINEPHRINE 0.3 MG/.3ML
0.3 INJECTION SUBCUTANEOUS EVERY 5 MIN PRN
Status: DISCONTINUED | OUTPATIENT
Start: 2024-01-23 | End: 2024-01-24 | Stop reason: HOSPADM

## 2024-01-23 RX ORDER — FAMOTIDINE 10 MG/ML
20 INJECTION INTRAVENOUS ONCE AS NEEDED
Status: DISCONTINUED | OUTPATIENT
Start: 2024-01-23 | End: 2024-01-24 | Stop reason: HOSPADM

## 2024-01-23 RX ORDER — DIPHENHYDRAMINE HYDROCHLORIDE 50 MG/ML
50 INJECTION INTRAMUSCULAR; INTRAVENOUS AS NEEDED
OUTPATIENT
Start: 2024-01-27

## 2024-01-23 RX ORDER — DIPHENHYDRAMINE HYDROCHLORIDE 50 MG/ML
50 INJECTION INTRAMUSCULAR; INTRAVENOUS AS NEEDED
Status: DISCONTINUED | OUTPATIENT
Start: 2024-01-23 | End: 2024-01-24 | Stop reason: HOSPADM

## 2024-01-23 RX ADMIN — IRON SUCROSE 300 MG: 20 INJECTION, SOLUTION INTRAVENOUS at 10:27

## 2024-01-23 ASSESSMENT — PAIN SCALES - GENERAL
PAINLEVEL: 0-NO PAIN
PAINLEVEL: 0-NO PAIN

## 2024-01-23 NOTE — PROGRESS NOTES
[facility] 1200 Infusion Clinic Note   Date: 2024   Name: Ivanna Addison  : 2000   MRN: [id number]         Reason for Visit: OP Infusion (Pt here for first iron infusion)      Accompanied by:Relative   Visit Type:: Infusion   Diagnosis: Anemia affecting pregnancy in third trimester    Gastroesophageal reflux during pregnancy in third trimester, antepartum    35 weeks gestation of pregnancy    Hypertension affecting pregnancy in third trimester    Dichorionic diamniotic twin pregnancy in third trimester    Pelvic pain in pregnancy    Allergies:   Allergies as of 2024    (No Known Allergies)      Current [facility] has a current medication list which includes the following prescription(s): aspirin, blood pressure test kit-large, diphenhydramine, doxylamine, famotidine, ferrous gluconate, pnv 119-iron fum-folic acid, and pyridoxine, and the following Facility-Administered Medications: albuterol, dextrose, diphenhydramine, epinephrine, famotidine pf, iron sucrose (Venofer) 300 mg in sodium chloride 0.9% 250 mL IV, methylprednisolone sod succinate (pf), and sodium chloride.        Vitals:  Vitals:    24 0912   BP: 131/85   Pulse: 101   Resp: 19   Temp: 36.4 °C (97.6 °F)   SpO2: 99%   Weight: 139 kg (307 lb)      Infusion Pre-procedure Checklist   Allergies reviewed: yes   Medications reviewed: yes   Contraindications to treatment:No   Previous reaction to current treatment:No   Current Health Issues: Pregnancy               Negative for complaint: [x] all other systems have been reviewed and are negative for complaint   Infusion Readiness:   Assessment Concerns Related to Infusion: No  Provider notified: n/a  Initiated By: BENJY DAVALOS RN       Patient remained in office for 30 additional minutes after infusion stopped to assess for reaction.   Patient remained stable the entire 30 minutes.   Patient tolerated infusion well, no reactions  or complications.   Patient discharged home ambulatory.      We administered iron sucrose (Venofer) 300 mg in sodium chloride 0.9% 250 mL IV.

## 2024-01-24 PROBLEM — Z3A.36 36 WEEKS GESTATION OF PREGNANCY (HHS-HCC): Status: ACTIVE | Noted: 2023-08-21

## 2024-01-25 ENCOUNTER — HOSPITAL ENCOUNTER (OUTPATIENT)
Dept: RADIOLOGY | Facility: CLINIC | Age: 24
Discharge: HOME | End: 2024-01-25
Payer: COMMERCIAL

## 2024-01-25 ENCOUNTER — ROUTINE PRENATAL (OUTPATIENT)
Dept: MATERNAL FETAL MEDICINE | Facility: CLINIC | Age: 24
End: 2024-01-25
Payer: COMMERCIAL

## 2024-01-25 VITALS
DIASTOLIC BLOOD PRESSURE: 87 MMHG | BODY MASS INDEX: 48.29 KG/M2 | HEART RATE: 78 BPM | WEIGHT: 293 LBS | SYSTOLIC BLOOD PRESSURE: 138 MMHG

## 2024-01-25 DIAGNOSIS — Z03.74 ENCOUNTER FOR SUSPECTED PROBLEM WITH FETAL GROWTH RULED OUT: ICD-10-CM

## 2024-01-25 DIAGNOSIS — O16.3 HYPERTENSION AFFECTING PREGNANCY IN THIRD TRIMESTER (HHS-HCC): ICD-10-CM

## 2024-01-25 DIAGNOSIS — Z3A.36 36 WEEKS GESTATION OF PREGNANCY (HHS-HCC): Primary | ICD-10-CM

## 2024-01-25 DIAGNOSIS — O30.043 DICHORIONIC DIAMNIOTIC TWIN PREGNANCY IN THIRD TRIMESTER (HHS-HCC): ICD-10-CM

## 2024-01-25 DIAGNOSIS — O99.013 ANEMIA AFFECTING PREGNANCY IN THIRD TRIMESTER (HHS-HCC): ICD-10-CM

## 2024-01-25 PROCEDURE — 76816 OB US FOLLOW-UP PER FETUS: CPT

## 2024-01-25 PROCEDURE — 99214 OFFICE O/P EST MOD 30 MIN: CPT | Performed by: OBSTETRICS & GYNECOLOGY

## 2024-01-25 PROCEDURE — 76816 OB US FOLLOW-UP PER FETUS: CPT | Performed by: STUDENT IN AN ORGANIZED HEALTH CARE EDUCATION/TRAINING PROGRAM

## 2024-01-25 PROCEDURE — 76819 FETAL BIOPHYS PROFIL W/O NST: CPT | Performed by: STUDENT IN AN ORGANIZED HEALTH CARE EDUCATION/TRAINING PROGRAM

## 2024-01-25 PROCEDURE — 99214 OFFICE O/P EST MOD 30 MIN: CPT | Mod: GC,25 | Performed by: OBSTETRICS & GYNECOLOGY

## 2024-01-25 PROCEDURE — 76819 FETAL BIOPHYS PROFIL W/O NST: CPT

## 2024-01-25 ASSESSMENT — PAIN SCALES - GENERAL: PAINLEVEL_OUTOF10: 7

## 2024-01-25 ASSESSMENT — PAIN - FUNCTIONAL ASSESSMENT: PAIN_FUNCTIONAL_ASSESSMENT: 0-10

## 2024-01-25 NOTE — ASSESSMENT & PLAN NOTE
- PNLs reviewed & UTD  - Will resend breast pump forms today   - US today with baby A breech.   - pLTCS scheduled for 1/29  - PTL signs/ sx reviewed

## 2024-01-25 NOTE — PROGRESS NOTES
MFM Follow-up  2024         SUBJECTIVE    HPI: Ivanna Addison is a 23 y.o.  at 36w2d here for RPNV. Denies ***contractions, ***bleeding, or ***LOF. Reports ***normal fetal movement. Patient reports ***    OBJECTIVE    Visit Vitals  LMP 2023   OB Status Pregnant   Smoking Status Never        FHT: ***    ASSESSMENT & PLAN    Ivanna Addison is a 23 y.o.  at 36w2d here for the following concerns we addressed today:    36 weeks gestation of pregnancy  -PNL;s reviewed UTD  -GBS Positive, for intrapartum PCN ppx      Hypertension affecting pregnancy in third trimester  -Bp___in clinic today  -Asymptomatic  -Will recommend IOL at 38-39wga pending BG control.     Dichorionic diamniotic twin pregnancy in third trimester  -. Twin A) 1849g (24%), AC 58%, Breech. Twin B) 1831g (22%), AC 40%, Cephalic. Concordant growth   -Scheduled CS on      No orders of the defined types were placed in this encounter.     For IOL     {Patient seen and evaluated with  ***}    Gabino Dowd MD

## 2024-01-25 NOTE — ASSESSMENT & PLAN NOTE
- Bp 146/86 in clinic today, repeat 138/87  - Home BPS 140s-150s   - Asymptomatic  - Continue no meds

## 2024-01-25 NOTE — PROGRESS NOTES
MFM Follow-up  2024         SUBJECTIVE    HPI: Ivanna Addison is a 23 y.o.  at 36w3d here for RPNV. Reports BH contractions; denies vaginal bleeding or LOF. Reports normal fetal movement.  Denies HA, blurry vision, or RUQ pain. Patient reports no concerns today. She is looking forward to delivery. All questions answered regarding upcoming CS      OBJECTIVE    Visit Vitals  /87   Pulse 78   Wt 140 kg (308 lb 4.8 oz)   LMP 2023   BMI 48.29 kg/m²   OB Status Pregnant   Smoking Status Never   BSA 2.57 m²        FHT: US    ASSESSMENT & PLAN    Ivanna Addison is a 23 y.o.  at 36w3d here for the following concerns we addressed today:    36 weeks gestation of pregnancy  - PNLs reviewed & UTD  - Will resend breast pump forms today   - US today with baby A breech.   - pLTCS scheduled for   - PTL signs/ sx reviewed      Hypertension affecting pregnancy in third trimester  - Bp 146/86 in clinic today, repeat 138/87  - Home BPS 140s-150s   - Asymptomatic  - Continue no meds    Dichorionic diamniotic twin pregnancy in third trimester  - Concordant growth; Twin A breech  -Scheduled CS on     Anemia affecting pregnancy in third trimester  - Received IV Fe x1. Will deliver prior to doses 2 & 3.  - Continue PO Fe    Prolapse of female pelvic organs  - Urogyn appt scheduled PP, 5/     Orders Placed This Encounter   Procedures    US [facility] OB imaging order     Standing Status:   Standing     Number of Occurrences:   4     Standing Expiration Date:   2025     Order Specific Question:   Reason for exam:     Answer:   Twin gestation     Order Specific Question:   Radiologist to Determine Optimal Study     Answer:   Yes     Order Specific Question:   Release result to [employer]     Answer:   Immediate [1]     Order Specific Question:   Is this exam part of a Research Study? If Yes, link this order to the research study     Answer:   No        RTC for PPV     Patient seen and evaluated with   Vandana Mi MD  PGY-1, Obstetrics & Gynecology   [facility]     I saw and evaluated the patient. I personally obtained the key and critical portions of the history and physical exam or was physically present for key and critical portions performed by the resident/fellow. I reviewed the resident/fellow's documentation and discussed the patient with the resident/fellow. I agree with the resident/fellow's medical decision making as documented in the note.     Briefly,  at 36w3d here for PNV. Growth normal x2. Initially mild range though normal on repeat. Scheduled for delivery at 37w.     Jesus Ross MD  [facility]

## 2024-01-26 ENCOUNTER — HOSPITAL ENCOUNTER (INPATIENT)
Facility: HOSPITAL | Age: 24
LOS: 2 days | Discharge: HOME | End: 2024-01-28
Attending: OBSTETRICS & GYNECOLOGY | Admitting: OBSTETRICS & GYNECOLOGY
Payer: COMMERCIAL

## 2024-01-26 ENCOUNTER — ANESTHESIA EVENT (OUTPATIENT)
Dept: OBSTETRICS AND GYNECOLOGY | Facility: HOSPITAL | Age: 24
End: 2024-01-26
Payer: COMMERCIAL

## 2024-01-26 LAB
ABO GROUP (TYPE) IN BLOOD: NORMAL
ABO GROUP (TYPE) IN BLOOD: NORMAL
ALBUMIN SERPL BCP-MCNC: 2.8 G/DL (ref 3.4–5)
ALP SERPL-CCNC: 132 U/L (ref 33–110)
ALT SERPL W P-5'-P-CCNC: 19 U/L (ref 7–45)
ANION GAP SERPL CALC-SCNC: 15 MMOL/L (ref 10–20)
ANTIBODY SCREEN: NORMAL
ANTIBODY SCREEN: NORMAL
AST SERPL W P-5'-P-CCNC: 19 U/L (ref 9–39)
BILIRUB SERPL-MCNC: 0.3 MG/DL (ref 0–1.2)
BUN SERPL-MCNC: 7 MG/DL (ref 6–23)
CALCIUM SERPL-MCNC: 8.8 MG/DL (ref 8.6–10.6)
CHLORIDE SERPL-SCNC: 107 MMOL/L (ref 98–107)
CO2 SERPL-SCNC: 18 MMOL/L (ref 21–32)
CREAT SERPL-MCNC: 0.46 MG/DL (ref 0.5–1.05)
EGFRCR SERPLBLD CKD-EPI 2021: >90 ML/MIN/1.73M*2
ERYTHROCYTE [DISTWIDTH] IN BLOOD BY AUTOMATED COUNT: 16.9 % (ref 11.5–14.5)
GLUCOSE SERPL-MCNC: 104 MG/DL (ref 74–99)
HCT VFR BLD AUTO: 30.9 % (ref 36–46)
HGB BLD-MCNC: 9.1 G/DL (ref 12–16)
MCH RBC QN AUTO: 25.1 PG (ref 26–34)
MCHC RBC AUTO-ENTMCNC: 29.4 G/DL (ref 32–36)
MCV RBC AUTO: 85 FL (ref 80–100)
NRBC BLD-RTO: 0 /100 WBCS (ref 0–0)
PLATELET # BLD AUTO: 220 X10*3/UL (ref 150–450)
POTASSIUM SERPL-SCNC: 4.1 MMOL/L (ref 3.5–5.3)
PROT SERPL-MCNC: 5.5 G/DL (ref 6.4–8.2)
RBC # BLD AUTO: 3.62 X10*6/UL (ref 4–5.2)
RH FACTOR (ANTIGEN D): NORMAL
RH FACTOR (ANTIGEN D): NORMAL
SODIUM SERPL-SCNC: 136 MMOL/L (ref 136–145)
TREPONEMA PALLIDUM IGG+IGM AB [PRESENCE] IN SERUM OR PLASMA BY IMMUNOASSAY: NONREACTIVE
WBC # BLD AUTO: 8.2 X10*3/UL (ref 4.4–11.3)

## 2024-01-26 PROCEDURE — 86077 PHYS BLOOD BANK SERV XMATCH: CPT

## 2024-01-26 PROCEDURE — 86901 BLOOD TYPING SEROLOGIC RH(D): CPT | Performed by: STUDENT IN AN ORGANIZED HEALTH CARE EDUCATION/TRAINING PROGRAM

## 2024-01-26 PROCEDURE — 51702 INSERT TEMP BLADDER CATH: CPT

## 2024-01-26 PROCEDURE — 01961 ANES CESAREAN DELIVERY ONLY: CPT | Performed by: STUDENT IN AN ORGANIZED HEALTH CARE EDUCATION/TRAINING PROGRAM

## 2024-01-26 PROCEDURE — 1210000001 HC SEMI-PRIVATE ROOM DAILY

## 2024-01-26 PROCEDURE — 86780 TREPONEMA PALLIDUM: CPT | Performed by: STUDENT IN AN ORGANIZED HEALTH CARE EDUCATION/TRAINING PROGRAM

## 2024-01-26 PROCEDURE — 86922 COMPATIBILITY TEST ANTIGLOB: CPT

## 2024-01-26 PROCEDURE — 36415 COLL VENOUS BLD VENIPUNCTURE: CPT | Performed by: STUDENT IN AN ORGANIZED HEALTH CARE EDUCATION/TRAINING PROGRAM

## 2024-01-26 PROCEDURE — 7100000016 HC LABOR RECOVERY PER HOUR: Performed by: OBSTETRICS & GYNECOLOGY

## 2024-01-26 PROCEDURE — 85027 COMPLETE CBC AUTOMATED: CPT | Performed by: STUDENT IN AN ORGANIZED HEALTH CARE EDUCATION/TRAINING PROGRAM

## 2024-01-26 PROCEDURE — 3700000018 HC OB ANESTHESIA C-SECTION: Performed by: OBSTETRICS & GYNECOLOGY

## 2024-01-26 PROCEDURE — 2500000005 HC RX 250 GENERAL PHARMACY W/O HCPCS: Performed by: NURSE ANESTHETIST, CERTIFIED REGISTERED

## 2024-01-26 PROCEDURE — 2500000004 HC RX 250 GENERAL PHARMACY W/ HCPCS (ALT 636 FOR OP/ED)

## 2024-01-26 PROCEDURE — 88307 TISSUE EXAM BY PATHOLOGIST: CPT | Mod: TC,SUR

## 2024-01-26 PROCEDURE — 99223 1ST HOSP IP/OBS HIGH 75: CPT

## 2024-01-26 PROCEDURE — 88307 TISSUE EXAM BY PATHOLOGIST: CPT | Performed by: PATHOLOGY

## 2024-01-26 PROCEDURE — 3700000014 HC AN EPIDURAL BLOCK CHARGE: Performed by: OBSTETRICS & GYNECOLOGY

## 2024-01-26 PROCEDURE — 80053 COMPREHEN METABOLIC PANEL: CPT | Performed by: STUDENT IN AN ORGANIZED HEALTH CARE EDUCATION/TRAINING PROGRAM

## 2024-01-26 PROCEDURE — 2500000004 HC RX 250 GENERAL PHARMACY W/ HCPCS (ALT 636 FOR OP/ED): Performed by: STUDENT IN AN ORGANIZED HEALTH CARE EDUCATION/TRAINING PROGRAM

## 2024-01-26 PROCEDURE — 2500000001 HC RX 250 WO HCPCS SELF ADMINISTERED DRUGS (ALT 637 FOR MEDICARE OP): Performed by: NURSE ANESTHETIST, CERTIFIED REGISTERED

## 2024-01-26 PROCEDURE — 86850 RBC ANTIBODY SCREEN: CPT | Performed by: STUDENT IN AN ORGANIZED HEALTH CARE EDUCATION/TRAINING PROGRAM

## 2024-01-26 PROCEDURE — 2720000007 HC OR 272 NO HCPCS: Performed by: OBSTETRICS & GYNECOLOGY

## 2024-01-26 PROCEDURE — 2500000004 HC RX 250 GENERAL PHARMACY W/ HCPCS (ALT 636 FOR OP/ED): Performed by: NURSE ANESTHETIST, CERTIFIED REGISTERED

## 2024-01-26 PROCEDURE — 86870 RBC ANTIBODY IDENTIFICATION: CPT

## 2024-01-26 PROCEDURE — 86901 BLOOD TYPING SEROLOGIC RH(D): CPT

## 2024-01-26 PROCEDURE — 59514 CESAREAN DELIVERY ONLY: CPT | Performed by: OBSTETRICS & GYNECOLOGY

## 2024-01-26 PROCEDURE — 86850 RBC ANTIBODY SCREEN: CPT

## 2024-01-26 PROCEDURE — 59514 CESAREAN DELIVERY ONLY: CPT | Performed by: STUDENT IN AN ORGANIZED HEALTH CARE EDUCATION/TRAINING PROGRAM

## 2024-01-26 RX ORDER — OXYCODONE HYDROCHLORIDE 5 MG/1
10 TABLET ORAL EVERY 4 HOURS PRN
Status: DISCONTINUED | OUTPATIENT
Start: 2024-01-27 | End: 2024-01-28 | Stop reason: HOSPADM

## 2024-01-26 RX ORDER — SODIUM CHLORIDE, SODIUM LACTATE, POTASSIUM CHLORIDE, CALCIUM CHLORIDE 600; 310; 30; 20 MG/100ML; MG/100ML; MG/100ML; MG/100ML
125 INJECTION, SOLUTION INTRAVENOUS CONTINUOUS
Status: DISCONTINUED | OUTPATIENT
Start: 2024-01-26 | End: 2024-01-28 | Stop reason: HOSPADM

## 2024-01-26 RX ORDER — OXYTOCIN/0.9 % SODIUM CHLORIDE 30/500 ML
60 PLASTIC BAG, INJECTION (ML) INTRAVENOUS ONCE AS NEEDED
Status: DISCONTINUED | OUTPATIENT
Start: 2024-01-26 | End: 2024-01-26

## 2024-01-26 RX ORDER — METHYLERGONOVINE MALEATE 0.2 MG/ML
0.2 INJECTION INTRAVENOUS ONCE AS NEEDED
Status: DISCONTINUED | OUTPATIENT
Start: 2024-01-26 | End: 2024-01-28 | Stop reason: HOSPADM

## 2024-01-26 RX ORDER — HYDROMORPHONE HYDROCHLORIDE 1 MG/ML
0.2 INJECTION, SOLUTION INTRAMUSCULAR; INTRAVENOUS; SUBCUTANEOUS EVERY 5 MIN PRN
Status: DISCONTINUED | OUTPATIENT
Start: 2024-01-26 | End: 2024-01-28 | Stop reason: HOSPADM

## 2024-01-26 RX ORDER — MISOPROSTOL 200 UG/1
800 TABLET ORAL ONCE AS NEEDED
Status: DISCONTINUED | OUTPATIENT
Start: 2024-01-26 | End: 2024-01-26

## 2024-01-26 RX ORDER — CARBOPROST TROMETHAMINE 250 UG/ML
250 INJECTION, SOLUTION INTRAMUSCULAR ONCE AS NEEDED
Status: DISCONTINUED | OUTPATIENT
Start: 2024-01-26 | End: 2024-01-28 | Stop reason: HOSPADM

## 2024-01-26 RX ORDER — DIPHENHYDRAMINE HCL 25 MG
25 CAPSULE ORAL EVERY 4 HOURS PRN
Status: DISCONTINUED | OUTPATIENT
Start: 2024-01-26 | End: 2024-01-28 | Stop reason: HOSPADM

## 2024-01-26 RX ORDER — LIDOCAINE HYDROCHLORIDE 10 MG/ML
30 INJECTION INFILTRATION; PERINEURAL ONCE AS NEEDED
Status: DISCONTINUED | OUTPATIENT
Start: 2024-01-26 | End: 2024-01-26

## 2024-01-26 RX ORDER — OXYTOCIN 10 [USP'U]/ML
10 INJECTION, SOLUTION INTRAMUSCULAR; INTRAVENOUS ONCE AS NEEDED
Status: DISCONTINUED | OUTPATIENT
Start: 2024-01-26 | End: 2024-01-28 | Stop reason: HOSPADM

## 2024-01-26 RX ORDER — LABETALOL HYDROCHLORIDE 5 MG/ML
20 INJECTION, SOLUTION INTRAVENOUS ONCE AS NEEDED
Status: DISCONTINUED | OUTPATIENT
Start: 2024-01-26 | End: 2024-01-26

## 2024-01-26 RX ORDER — BUTORPHANOL TARTRATE 2 MG/ML
1 INJECTION INTRAMUSCULAR; INTRAVENOUS
Status: DISCONTINUED | OUTPATIENT
Start: 2024-01-26 | End: 2024-01-28 | Stop reason: HOSPADM

## 2024-01-26 RX ORDER — OXYTOCIN 10 [USP'U]/ML
10 INJECTION, SOLUTION INTRAMUSCULAR; INTRAVENOUS ONCE AS NEEDED
Status: DISCONTINUED | OUTPATIENT
Start: 2024-01-26 | End: 2024-01-26

## 2024-01-26 RX ORDER — HYDRALAZINE HYDROCHLORIDE 20 MG/ML
5 INJECTION INTRAMUSCULAR; INTRAVENOUS ONCE AS NEEDED
Status: DISCONTINUED | OUTPATIENT
Start: 2024-01-26 | End: 2024-01-26

## 2024-01-26 RX ORDER — METOCLOPRAMIDE 10 MG/1
10 TABLET ORAL EVERY 6 HOURS PRN
Status: DISCONTINUED | OUTPATIENT
Start: 2024-01-26 | End: 2024-01-26

## 2024-01-26 RX ORDER — OXYCODONE HYDROCHLORIDE 5 MG/1
5 TABLET ORAL EVERY 4 HOURS PRN
Status: DISCONTINUED | OUTPATIENT
Start: 2024-01-27 | End: 2024-01-28 | Stop reason: HOSPADM

## 2024-01-26 RX ORDER — NIFEDIPINE 10 MG/1
10 CAPSULE ORAL ONCE AS NEEDED
Status: DISCONTINUED | OUTPATIENT
Start: 2024-01-26 | End: 2024-01-28 | Stop reason: HOSPADM

## 2024-01-26 RX ORDER — ONDANSETRON 4 MG/1
4 TABLET, FILM COATED ORAL EVERY 6 HOURS PRN
Status: DISCONTINUED | OUTPATIENT
Start: 2024-01-26 | End: 2024-01-26

## 2024-01-26 RX ORDER — ONDANSETRON HYDROCHLORIDE 2 MG/ML
4 INJECTION, SOLUTION INTRAVENOUS EVERY 6 HOURS PRN
Status: DISCONTINUED | OUTPATIENT
Start: 2024-01-26 | End: 2024-01-28 | Stop reason: HOSPADM

## 2024-01-26 RX ORDER — LOPERAMIDE HYDROCHLORIDE 2 MG/1
4 CAPSULE ORAL EVERY 2 HOUR PRN
Status: DISCONTINUED | OUTPATIENT
Start: 2024-01-26 | End: 2024-01-26

## 2024-01-26 RX ORDER — KETOROLAC TROMETHAMINE 30 MG/ML
INJECTION, SOLUTION INTRAMUSCULAR; INTRAVENOUS AS NEEDED
Status: DISCONTINUED | OUTPATIENT
Start: 2024-01-26 | End: 2024-01-26

## 2024-01-26 RX ORDER — LOPERAMIDE HYDROCHLORIDE 2 MG/1
4 CAPSULE ORAL EVERY 2 HOUR PRN
Status: DISCONTINUED | OUTPATIENT
Start: 2024-01-26 | End: 2024-01-28 | Stop reason: HOSPADM

## 2024-01-26 RX ORDER — LABETALOL HYDROCHLORIDE 5 MG/ML
20 INJECTION, SOLUTION INTRAVENOUS ONCE AS NEEDED
Status: DISCONTINUED | OUTPATIENT
Start: 2024-01-26 | End: 2024-01-28 | Stop reason: HOSPADM

## 2024-01-26 RX ORDER — ENOXAPARIN SODIUM 100 MG/ML
60 INJECTION SUBCUTANEOUS EVERY 24 HOURS
Status: DISCONTINUED | OUTPATIENT
Start: 2024-01-27 | End: 2024-01-28 | Stop reason: HOSPADM

## 2024-01-26 RX ORDER — ADHESIVE BANDAGE
10 BANDAGE TOPICAL
Status: DISCONTINUED | OUTPATIENT
Start: 2024-01-26 | End: 2024-01-28 | Stop reason: HOSPADM

## 2024-01-26 RX ORDER — ONDANSETRON 4 MG/1
4 TABLET, FILM COATED ORAL EVERY 6 HOURS PRN
Status: DISCONTINUED | OUTPATIENT
Start: 2024-01-26 | End: 2024-01-28 | Stop reason: HOSPADM

## 2024-01-26 RX ORDER — HYDRALAZINE HYDROCHLORIDE 20 MG/ML
5 INJECTION INTRAMUSCULAR; INTRAVENOUS ONCE AS NEEDED
Status: DISCONTINUED | OUTPATIENT
Start: 2024-01-26 | End: 2024-01-28 | Stop reason: HOSPADM

## 2024-01-26 RX ORDER — CEFAZOLIN 1 G/1
INJECTION, POWDER, FOR SOLUTION INTRAVENOUS AS NEEDED
Status: DISCONTINUED | OUTPATIENT
Start: 2024-01-26 | End: 2024-01-26

## 2024-01-26 RX ORDER — CARBOPROST TROMETHAMINE 250 UG/ML
250 INJECTION, SOLUTION INTRAMUSCULAR ONCE AS NEEDED
Status: DISCONTINUED | OUTPATIENT
Start: 2024-01-26 | End: 2024-01-26

## 2024-01-26 RX ORDER — SODIUM CHLORIDE, SODIUM LACTATE, POTASSIUM CHLORIDE, CALCIUM CHLORIDE 600; 310; 30; 20 MG/100ML; MG/100ML; MG/100ML; MG/100ML
125 INJECTION, SOLUTION INTRAVENOUS CONTINUOUS
Status: DISCONTINUED | OUTPATIENT
Start: 2024-01-26 | End: 2024-01-26

## 2024-01-26 RX ORDER — OXYTOCIN/0.9 % SODIUM CHLORIDE 30/500 ML
60 PLASTIC BAG, INJECTION (ML) INTRAVENOUS ONCE AS NEEDED
Status: DISCONTINUED | OUTPATIENT
Start: 2024-01-26 | End: 2024-01-28 | Stop reason: HOSPADM

## 2024-01-26 RX ORDER — DIPHENHYDRAMINE HYDROCHLORIDE 50 MG/ML
25 INJECTION INTRAMUSCULAR; INTRAVENOUS EVERY 4 HOURS PRN
Status: DISCONTINUED | OUTPATIENT
Start: 2024-01-26 | End: 2024-01-28 | Stop reason: HOSPADM

## 2024-01-26 RX ORDER — BUPIVACAINE HYDROCHLORIDE 7.5 MG/ML
INJECTION, SOLUTION EPIDURAL; RETROBULBAR AS NEEDED
Status: DISCONTINUED | OUTPATIENT
Start: 2024-01-26 | End: 2024-01-26

## 2024-01-26 RX ORDER — MISOPROSTOL 200 UG/1
800 TABLET ORAL ONCE AS NEEDED
Status: DISCONTINUED | OUTPATIENT
Start: 2024-01-26 | End: 2024-01-28 | Stop reason: HOSPADM

## 2024-01-26 RX ORDER — KETOROLAC TROMETHAMINE 30 MG/ML
30 INJECTION, SOLUTION INTRAMUSCULAR; INTRAVENOUS EVERY 6 HOURS
Status: COMPLETED | OUTPATIENT
Start: 2024-01-26 | End: 2024-01-27

## 2024-01-26 RX ORDER — LIDOCAINE 560 MG/1
1 PATCH PERCUTANEOUS; TOPICAL; TRANSDERMAL
Status: DISCONTINUED | OUTPATIENT
Start: 2024-01-26 | End: 2024-01-28 | Stop reason: HOSPADM

## 2024-01-26 RX ORDER — TRANEXAMIC ACID 100 MG/ML
1000 INJECTION, SOLUTION INTRAVENOUS ONCE AS NEEDED
Status: DISCONTINUED | OUTPATIENT
Start: 2024-01-26 | End: 2024-01-28 | Stop reason: HOSPADM

## 2024-01-26 RX ORDER — METHYLERGONOVINE MALEATE 0.2 MG/ML
0.2 INJECTION INTRAVENOUS ONCE AS NEEDED
Status: DISCONTINUED | OUTPATIENT
Start: 2024-01-26 | End: 2024-01-26

## 2024-01-26 RX ORDER — SIMETHICONE 80 MG
80 TABLET,CHEWABLE ORAL 4 TIMES DAILY PRN
Status: DISCONTINUED | OUTPATIENT
Start: 2024-01-26 | End: 2024-01-28 | Stop reason: HOSPADM

## 2024-01-26 RX ORDER — METOCLOPRAMIDE HYDROCHLORIDE 5 MG/ML
10 INJECTION INTRAMUSCULAR; INTRAVENOUS EVERY 6 HOURS PRN
Status: DISCONTINUED | OUTPATIENT
Start: 2024-01-26 | End: 2024-01-26

## 2024-01-26 RX ORDER — NALOXONE HYDROCHLORIDE 0.4 MG/ML
0.1 INJECTION, SOLUTION INTRAMUSCULAR; INTRAVENOUS; SUBCUTANEOUS EVERY 5 MIN PRN
Status: DISCONTINUED | OUTPATIENT
Start: 2024-01-26 | End: 2024-01-28 | Stop reason: HOSPADM

## 2024-01-26 RX ORDER — ACETAMINOPHEN 120 MG/1
SUPPOSITORY RECTAL AS NEEDED
Status: DISCONTINUED | OUTPATIENT
Start: 2024-01-26 | End: 2024-01-26

## 2024-01-26 RX ORDER — POLYETHYLENE GLYCOL 3350 17 G/17G
17 POWDER, FOR SOLUTION ORAL 2 TIMES DAILY PRN
Status: DISCONTINUED | OUTPATIENT
Start: 2024-01-26 | End: 2024-01-28 | Stop reason: HOSPADM

## 2024-01-26 RX ORDER — TRANEXAMIC ACID 100 MG/ML
1000 INJECTION, SOLUTION INTRAVENOUS ONCE AS NEEDED
Status: DISCONTINUED | OUTPATIENT
Start: 2024-01-26 | End: 2024-01-26

## 2024-01-26 RX ORDER — PHENYLEPHRINE 10 MG/250 ML(40 MCG/ML)IN 0.9 % SOD.CHLORIDE INTRAVENOUS
CONTINUOUS PRN
Status: DISCONTINUED | OUTPATIENT
Start: 2024-01-26 | End: 2024-01-26

## 2024-01-26 RX ORDER — IBUPROFEN 600 MG/1
600 TABLET ORAL EVERY 6 HOURS
Status: DISCONTINUED | OUTPATIENT
Start: 2024-01-27 | End: 2024-01-28 | Stop reason: HOSPADM

## 2024-01-26 RX ORDER — BISACODYL 10 MG/1
10 SUPPOSITORY RECTAL DAILY PRN
Status: DISCONTINUED | OUTPATIENT
Start: 2024-01-26 | End: 2024-01-28 | Stop reason: HOSPADM

## 2024-01-26 RX ORDER — MORPHINE SULFATE 0.5 MG/ML
INJECTION, SOLUTION EPIDURAL; INTRATHECAL; INTRAVENOUS AS NEEDED
Status: DISCONTINUED | OUTPATIENT
Start: 2024-01-26 | End: 2024-01-26

## 2024-01-26 RX ORDER — ACETAMINOPHEN 325 MG/1
975 TABLET ORAL EVERY 6 HOURS
Status: DISCONTINUED | OUTPATIENT
Start: 2024-01-26 | End: 2024-01-28 | Stop reason: HOSPADM

## 2024-01-26 RX ORDER — NIFEDIPINE 10 MG/1
10 CAPSULE ORAL ONCE AS NEEDED
Status: DISCONTINUED | OUTPATIENT
Start: 2024-01-26 | End: 2024-01-26

## 2024-01-26 RX ORDER — ONDANSETRON HYDROCHLORIDE 2 MG/ML
4 INJECTION, SOLUTION INTRAVENOUS EVERY 6 HOURS PRN
Status: DISCONTINUED | OUTPATIENT
Start: 2024-01-26 | End: 2024-01-26

## 2024-01-26 RX ORDER — TERBUTALINE SULFATE 1 MG/ML
0.25 INJECTION SUBCUTANEOUS ONCE AS NEEDED
Status: DISCONTINUED | OUTPATIENT
Start: 2024-01-26 | End: 2024-01-26

## 2024-01-26 RX ORDER — AZITHROMYCIN 100 MG/ML
INJECTION, POWDER, LYOPHILIZED, FOR SOLUTION INTRAVENOUS AS NEEDED
Status: DISCONTINUED | OUTPATIENT
Start: 2024-01-26 | End: 2024-01-26

## 2024-01-26 RX ADMIN — ACETAMINOPHEN 650 MG: 120 SUPPOSITORY RECTAL at 07:31

## 2024-01-26 RX ADMIN — KETOROLAC TROMETHAMINE 30 MG: 30 INJECTION, SOLUTION INTRAMUSCULAR at 07:23

## 2024-01-26 RX ADMIN — BUPIVACAINE HYDROCHLORIDE 1.6 ML: 7.5 INJECTION, SOLUTION EPIDURAL; RETROBULBAR at 06:54

## 2024-01-26 RX ADMIN — CEFAZOLIN 3 G: 330 INJECTION, POWDER, FOR SOLUTION INTRAMUSCULAR; INTRAVENOUS at 06:57

## 2024-01-26 RX ADMIN — KETOROLAC TROMETHAMINE 30 MG: 30 INJECTION, SOLUTION INTRAMUSCULAR; INTRAVENOUS at 13:15

## 2024-01-26 RX ADMIN — SODIUM CHLORIDE, POTASSIUM CHLORIDE, SODIUM LACTATE AND CALCIUM CHLORIDE 125 ML/HR: 600; 310; 30; 20 INJECTION, SOLUTION INTRAVENOUS at 09:50

## 2024-01-26 RX ADMIN — ACETAMINOPHEN 975 MG: 325 TABLET ORAL at 13:15

## 2024-01-26 RX ADMIN — ONDANSETRON 4 MG: 2 INJECTION, SOLUTION INTRAMUSCULAR; INTRAVENOUS at 06:56

## 2024-01-26 RX ADMIN — SODIUM CHLORIDE 600 MILLI-UNITS/MIN: 9 INJECTION, SOLUTION INTRAVENOUS at 07:00

## 2024-01-26 RX ADMIN — KETOROLAC TROMETHAMINE 30 MG: 30 INJECTION, SOLUTION INTRAMUSCULAR; INTRAVENOUS at 19:00

## 2024-01-26 RX ADMIN — Medication 0.36 MCG/KG/MIN: at 06:55

## 2024-01-26 RX ADMIN — SODIUM CHLORIDE, POTASSIUM CHLORIDE, SODIUM LACTATE AND CALCIUM CHLORIDE 125 ML/HR: 600; 310; 30; 20 INJECTION, SOLUTION INTRAVENOUS at 13:16

## 2024-01-26 RX ADMIN — ACETAMINOPHEN 975 MG: 325 TABLET ORAL at 19:00

## 2024-01-26 RX ADMIN — MORPHINE SULFATE 0.15 MG: 0.5 INJECTION EPIDURAL; INTRATHECAL; INTRAVENOUS at 06:54

## 2024-01-26 RX ADMIN — SODIUM CHLORIDE, SODIUM LACTATE, POTASSIUM CHLORIDE, AND CALCIUM CHLORIDE: 600; 310; 30; 20 INJECTION, SOLUTION INTRAVENOUS at 06:52

## 2024-01-26 RX ADMIN — AZITHROMYCIN MONOHYDRATE 500 MG: 500 INJECTION, POWDER, LYOPHILIZED, FOR SOLUTION INTRAVENOUS at 06:58

## 2024-01-26 SDOH — HEALTH STABILITY: MENTAL HEALTH: WISH TO BE DEAD (PAST 1 MONTH): NO

## 2024-01-26 SDOH — SOCIAL STABILITY: SOCIAL INSECURITY: VERBAL ABUSE: DENIES

## 2024-01-26 SDOH — SOCIAL STABILITY: SOCIAL INSECURITY: ARE YOU OR HAVE YOU BEEN THREATENED OR ABUSED PHYSICALLY, EMOTIONALLY, OR SEXUALLY BY ANYONE?: NO

## 2024-01-26 SDOH — HEALTH STABILITY: MENTAL HEALTH: SUICIDAL BEHAVIOR (LIFETIME): NO

## 2024-01-26 SDOH — SOCIAL STABILITY: SOCIAL INSECURITY: ABUSE SCREEN: ADULT

## 2024-01-26 SDOH — SOCIAL STABILITY: SOCIAL INSECURITY: HAVE YOU HAD THOUGHTS OF HARMING ANYONE ELSE?: NO

## 2024-01-26 SDOH — SOCIAL STABILITY: SOCIAL INSECURITY: DOES ANYONE TRY TO KEEP YOU FROM HAVING/CONTACTING OTHER FRIENDS OR DOING THINGS OUTSIDE YOUR HOME?: NO

## 2024-01-26 SDOH — SOCIAL STABILITY: SOCIAL INSECURITY: PHYSICAL ABUSE: DENIES

## 2024-01-26 SDOH — ECONOMIC STABILITY: HOUSING INSECURITY: DO YOU FEEL UNSAFE GOING BACK TO THE PLACE WHERE YOU ARE LIVING?: NO

## 2024-01-26 SDOH — HEALTH STABILITY: MENTAL HEALTH: WERE YOU ABLE TO COMPLETE ALL THE BEHAVIORAL HEALTH SCREENINGS?: YES

## 2024-01-26 SDOH — HEALTH STABILITY: MENTAL HEALTH: NON-SPECIFIC ACTIVE SUICIDAL THOUGHTS (PAST 1 MONTH): NO

## 2024-01-26 SDOH — SOCIAL STABILITY: SOCIAL INSECURITY: ARE THERE ANY APPARENT SIGNS OF INJURIES/BEHAVIORS THAT COULD BE RELATED TO ABUSE/NEGLECT?: NO

## 2024-01-26 SDOH — HEALTH STABILITY: MENTAL HEALTH: CURRENT SMOKER: 0

## 2024-01-26 SDOH — SOCIAL STABILITY: SOCIAL INSECURITY: DO YOU FEEL ANYONE HAS EXPLOITED OR TAKEN ADVANTAGE OF YOU FINANCIALLY OR OF YOUR PERSONAL PROPERTY?: NO

## 2024-01-26 SDOH — SOCIAL STABILITY: SOCIAL INSECURITY: HAS ANYONE EVER THREATENED TO HURT YOUR FAMILY OR YOUR PETS?: NO

## 2024-01-26 ASSESSMENT — LIFESTYLE VARIABLES
AUDIT-C TOTAL SCORE: 0
AUDIT-C TOTAL SCORE: 0
HOW MANY STANDARD DRINKS CONTAINING ALCOHOL DO YOU HAVE ON A TYPICAL DAY: PATIENT DOES NOT DRINK
SKIP TO QUESTIONS 9-10: 1
HOW OFTEN DO YOU HAVE A DRINK CONTAINING ALCOHOL: NEVER
HOW OFTEN DO YOU HAVE 6 OR MORE DRINKS ON ONE OCCASION: NEVER

## 2024-01-26 ASSESSMENT — PAIN SCALES - GENERAL
PAINLEVEL_OUTOF10: 0 - NO PAIN
PAINLEVEL_OUTOF10: 0 - NO PAIN
PAINLEVEL_OUTOF10: 2
PAINLEVEL_OUTOF10: 0 - NO PAIN
PAINLEVEL_OUTOF10: 5 - MODERATE PAIN
PAINLEVEL_OUTOF10: 0 - NO PAIN
PAINLEVEL_OUTOF10: 0 - NO PAIN

## 2024-01-26 ASSESSMENT — PATIENT HEALTH QUESTIONNAIRE - PHQ9
1. LITTLE INTEREST OR PLEASURE IN DOING THINGS: NOT AT ALL
SUM OF ALL RESPONSES TO PHQ9 QUESTIONS 1 & 2: 0
2. FEELING DOWN, DEPRESSED OR HOPELESS: NOT AT ALL

## 2024-01-26 NOTE — SIGNIFICANT EVENT
BP Cuff and Home Monitoring   Patient meets criteria for home monitoring of blood pressure post discharge.  Met with patient to assess for availability of home BP monitor.  Patient does not have access to BP monitor at home.  Pt agreed to order home BP monitor from [email or web address]. BP monitor delivered to room.  Patient educated on how to use BP monitor, recording BP’s on home monitoring log and s/sx to report to her provider.  Pt verbalized understanding the above information.    XL cuff given

## 2024-01-26 NOTE — ANESTHESIA PREPROCEDURE EVALUATION
Patient: Ivanna Addison    Evaluation Method: In-person visit    Procedure Information       Anesthesia Start Date/Time: 24 0647    Procedure:  Section - 1030    Location: [facility] 04 / Virtual [facility] 2 OB    Surgeons: Eliana Guzman MD            Relevant Problems   Anesthesia (within normal limits)      Cardiovascular   (+) Hypertension affecting pregnancy in third trimester      GI   (+) Gastroesophageal reflux during pregnancy in third trimester, antepartum      Hematology   (+) Anemia affecting pregnancy in third trimester       Clinical information reviewed:    Allergies  Meds               NPO Detail:  No data recorded     OB/GYN     Physical Exam    Airway  Mallampati: II  TM distance: >3 FB  Neck ROM: full     Cardiovascular - normal exam  Rhythm: regular  Rate: normal     Dental - normal exam     Pulmonary - normal exam     Abdominal            Anesthesia Plan    History of general anesthesia?: yes  History of complications of general anesthesia?: no    ASA 2     spinal     The patient is not a current smoker.  Patient did not smoke on day of procedure.    Anesthetic plan and risks discussed with patient.    Plan discussed with attending.

## 2024-01-26 NOTE — L&D DELIVERY NOTE
OB Delivery Note  2024  Ivanna Addison  23 y.o.      Azael, aONEMikaylSander [[id number]]   , Low Transverse      Azael, bTWOMikaAnahi [[id number]]   , Low Transverse      Date: 2024 - 2024  [place] [facility] [place] [facility]    Name: Ivanna Addison, : 2000, Age: 23 y.o., MRN: [id number], Sex: female    Diagnosis  Pre-op Diagnosis     * Delivery by  section at 37-39 weeks of gestation due to labor [O75.82]     * Dichorionic diamniotic twin           pregnancy     *  premature rupture of          Membranes     *  labor     * Fetal transverse malpresentation Post-op Diagnosis     * Delivery by  section at 37-39 weeks of gestation due to labor [O75.82]     Procedures   Section  30675 - [place]  DELIVERY ONLY    Surgeons      * Jacqueline Anderson - Primary    Resident/Fellow/Other Assistant:  Surgeon(s) and Role: Ender Brown (PGY-4)    Procedure Summary  Anesthesia: Epidural  ASA: II  Anesthesia Staff: Anesthesiologist: Yoel Estrada DO; Clay Gaffney MD PhD  CRNA: JESSICA Gonzalez-CRNA  Anesthesia Resident: Migue Fierro MD    Estimated Blood Loss: 1000 mL        Specimen: placenta     Staff:   Scrub Person: Cely Rain; Yady Campos    Indication:   Patient presented ruptured in active labor at complete dilation with transverse malpresentation confirmed on BSUS.    Informed Consent:  The risks, benefits, complications, and alternatives were discussed with the patient. The patient understood that the risks of  section include, but are not limited to: injury to nearby structures or organs, infection, blood loss and possible need for transfusion, and potential need for more surgery including hysterectomy. The patient stated understanding and desired to proceed. All questions were answered. The patient was identified as Ivanna Addison and the procedure verified as a  delivery. A Time Out was held and the above  information confirmed.    Findings:   Normal appearing gravid uterus, fallopian tubes, and ovaries. Amniotic fluid clear,     A: Female, delivered first, breech  B: Male, delivered second, cephalic    Procedure:   Patient was taken to the OR where combined spinal epidural anesthesia was administered.  She was then placed in the dorsal supine position with a left lateral tilt. A fermin catheter was placed, SCDs were applied, and a vaginal prep was performed. A pre-procedure time out was performed.  The patient was given prophylactic dose of IV antibiotics. A betadine splash was performed. A Pfannenstiel skin incision was made through the skin with the scalpel and then carried through the subcutaneous fat to the underlying fascial layer. The fascia was incised on either side of the midline with the scalpel, and fascia was then dissected off the rectus muscle bilaterally bluntly. The muscles were divided in the midline, the peritoneum was identified and then entered bluntly, and incision extended superiorly and inferiorly with good visualization of bladder below. Bladder blade was inserted. A low transverse uterine incision was made with the scalpel, the uterine cavity was entered, and the hysterotomy was extended bilaterally with cephalocaudal stretching. Fetus A was delivered first using standard breech maneuvers. The cord was clamped and cut after 30 second delay and infant was handed off to awaiting nursing staff. Fetus B was delivered cephalic atraumatically. The cord was clamped and cut after 30 second delay and infant was handed off to awaiting nursing staff. Cord segments and blood samples were collected. The placenta was then expressed. The uterus was exteriorized and cleared of all clot and debris. The uterine incision was repaired using a running stitch of 0-Vicryl. Hemostasis was noted. The uterus was then placed back inside the pelvis, the gutters were cleared of all clots and debris. The hysterotomy was  again evaluated and found to be hemostatic, and the underside of the fascia and bladder and the rectus muscles were also found to be hemostatic. The fascia was closed using a running stitch of 0-PDS. The subcutaneous layer was irrigated, small bleeding vessels were cauterized, and the subcutaneous layer was reapproximated using a running stitch of 2-0 Monocryl. The skin was closed with 4-0 Monocryl.  All counts were correct, the patient tolerated the procedure well. Dr. Anderson was present for all key portions of the procedure. The patient and infants were taken back to the recovery room in stable condition.    Disposition: Labor Room  Condition: Stable    Gestational Age: 36w4d  /Para:     AddisonGenoveva [[id number]]      Labor Events    Sac identifier: Sac 1  Rupture date/time: 2024 0530  Rupture type: Spontaneous  Fluid color: Clear  Fluid odor: None  Labor type: Spontaneous Onset of Labor  Labor allowed to proceed with plans for an attempted vaginal birth?: No  Augmentation: None  Complications: None  Additional complications: Breech birth, fetus 1       Labor Event Times    Decision date/time (emergent ): 2024 0643       Placenta    Placenta delivery date/time: 2024 0704  Placenta removal: Manual removal  Placenta appearance: Intact  Placenta disposition: pathology       Cord    Vessels: 3 vessels  Complications: None  Delayed cord clamping?: Yes  Cord blood disposition: Lab  Gases sent?: No  Stem cell collection (by provider): No       Lacerations    Episiotomy: None  Perineal laceration: None  Other lacerations?: No  Repair suture: None       Anesthesia    Method: Spinal       Operative Delivery    Forceps attempted?: No  Vacuum extractor attempted?: No       Shoulder Dystocia    Shoulder dystocia present?: No       [place] Delivery    Birth date/time: 2024 07:00:00  Delivery type: , Low Transverse   categorization: primary   priority:  urgent  Indications for : Breech  Incision type: low transverse  Complications: None       Resuscitation    Method: Suctioning       Apgars    Living status: Living  Apgar Component Scores:  1 min.:  5 min.:  10 min.:  15 min.:  20 min.:    Skin color:  0  0       Heart rate:  2  2       Reflex irritability:  2  2       Muscle tone:  2  2       Respiratory effort:  2  2       Total:  8  8       Apgars assigned by: MIKE OBRIEN       Delivery Providers    Delivering clinician: Jacqueline Anderson MD   Provider Role    Kym Guzman, RN Delivery Nurse     Nursery Nurse    Ender Brown MD Resident    Vashti Howard, MIKE Registered Nurse    Ольга Solomon, RN Charge Nurse    Maxwell De La Vega, RN Registered Nurse    Kym Muniz, RN Charge Nurse    Cely Rain Scrub Nurse               Azael bTWOMikaylaBoy [[id number]]      Labor Events    Sac identifier: Sac 1  Rupture date/time: 2024 0530  Rupture type: Spontaneous  Fluid color: Clear  Fluid odor: None  Labor type: Spontaneous Onset of Labor  Labor allowed to proceed with plans for an attempted vaginal birth?: No  Augmentation: None  Complications: None  Additional complications: Breech birth, fetus 1       Labor Event Times    Decision date/time (emergent ): 2024 0643       Placenta    Placenta delivery date/time: 2024 0704  Placenta removal: Manual removal  Placenta appearance: Intact  Placenta disposition: pathology       Cord    Vessels: 3 vessels  Complications: None  Delayed cord clamping?: Yes  Cord blood disposition: Lab  Gases sent?: No  Stem cell collection (by provider): No       Lacerations    Episiotomy: None  Perineal laceration: None  Other lacerations?: No  Repair suture: None       Anesthesia    Method: Spinal       Operative Delivery    Forceps attempted?: No  Vacuum extractor attempted?: No       Shoulder Dystocia    Shoulder dystocia present?: No       [place] Delivery    Birth date/time: [birth year]24  07:02:00  Delivery type: , Low Transverse   categorization: primary   priority: urgent  Indications for : Breech, Multiple Gestation  Incision type: low transverse  Complications: None       Resuscitation    Method: Suctioning       Apgars    Living status: Living  Apgar Component Scores:  1 min.:  5 min.:  10 min.:  15 min.:  20 min.:    Skin color:         Heart rate:         Reflex irritability:         Muscle tone:         Respiratory effort:         Total:                Delivery Providers    Delivering clinician: Jacqueline Anderson MD   Provider Role    Kym Guzman, MIKE Delivery Nurse     Nursery Nurse    Ender Brown MD Resident    Vashti Howard RN Registered Nurse    Ольга Solomon, MIKE Charge Nurse    Kym Muniz, RN Charge Nurse    Maxwell De La Vega, RN Registered Nurse    Cely Rain Scrub Nurse                 Ender Brown MD

## 2024-01-26 NOTE — H&P
Obstetrical Admission History and Physical     Ivanna Addison is a 23 y.o.  at 36w4d. JEANNINE: 2024 by LMP c/w 6 wk US. Estimated fetal weight: 2583g & 2655g for Twin A and B, respectively. She has had prenatal care with [facility] and [facility] . Admitted for pCS in the s/o di/di twin gestation and active labor with fetal malpresentation.    Chief Complaint: No chief complaint on file.    Assessment/Plan      For pCS  - Patient presented to triage reporting contractions q2min. SVE 10 cm dilated and ruptured  - Twin A breech on BSUS. Patient taken to OR for urgent CS  - type & screen, CBC on admission   - Pain management per Anesthesia team    cHTN  - Mild range blood pressure x1 in office yesterday. Normotensive on recheck  - Last HELLP labs nl on , P:C 0.13  - No meds    Anemia  - Last hgb 9.8 on   - T&C x2 units pRBC    Asthma  - per chart review  - plan to avoid Hemabate    Di/Di Twin gestation  -  bpm and 140 bpm, for Twin A and B, respectively  - Concordantly grown. Last US : Twin A - 2583g 20%, AC 65%. Twin B - 2655g 26%, AC 55%  - GBS pos    To be Discussed with Dr. Justin Roche MD, PGY-1    Subjective   Indication for Primary  Section  Patient presented to triage with complaints of q2min contractions and LOF    Pregnancy notable for:  - Anemia  - cHTN: no meds  - Asthma  - Di/di twin gestation     Obstetrical History   OB History    Para Term  AB Living   4 3 3     3   SAB IAB Ectopic Multiple Live Births           3      # Outcome Date GA Lbr Isidoro/2nd Weight Sex Delivery Anes PTL Lv   4 Current            3 Term 22 38w1d  2.948 kg F Vag-Spont EPI N ARSEN   2 Term 20 39w0d  3.26 kg M Vag-Spont EPI N ARSEN   1 Term 18 39w6d  3.714 kg F Vag-Spont EPI N ARSEN       Past Medical History  Past Medical History:   Diagnosis Date    Chronic hypertension     Eczema         Past Surgical History   Past Surgical History:   Procedure Laterality Date     CHOLECYSTECTOMY N/A 2018       Social History  Social History     Tobacco Use    Smoking status: Never    Smokeless tobacco: Not on file   Substance Use Topics    Alcohol use: Never     Substance and Sexual Activity   Drug Use Never       Allergies  Patient has no known allergies.     Medications  Medications Prior to Admission   Medication Sig Dispense Refill Last Dose    aspirin 81 mg EC tablet Take 2 tablets (162 mg) by mouth once daily. 180 tablet 2     blood pressure test kit-large kit 1 kit 2 times a day. 1 each 0     diphenhydrAMINE (Unisom SleepMinis) 25 mg capsule Take 1 capsule (25 mg) by mouth once daily at bedtime. With vitamin B-6 for nausea and vomiting       doxylamine (Unisom) 25 mg tablet TAKE 1 TABLET ONCE DAILY AT BEDTIME ALONG WITH VITAMIN B6 FOR NAUSEA AND VOMITING 32 tablet 3     famotidine (Pepcid) 20 mg tablet Take 1 tablet (20 mg) by mouth 2 times a day. 90 tablet 3     ferrous gluconate (Fergon) 324 (38 Fe) mg tablet Take 1 tablet (38 mg of iron) by mouth every other day. 45 tablet 3     -iron fum-folic acid 29 mg iron- 1 mg tablet TAKE 1 TABLET DAILY. 90 tablet 3     pyridoxine (Vitamin B-6) 25 mg tablet TAKE 1 TABLET BY MOUTH EVERY 6 HOURS 120 tablet 3        Objective    Last Vitals  Temp Pulse Resp BP MAP O2 Sat                   Physical Examination  GENERAL: Examination reveals a well developed, well nourished, gravid female Mickie uncomfortably. She is alert and cooperative.  LUNGS: Normal work of breathing on room air  HEART: Warm and well perfused  EXTREMITIES: Full ROM  NEUROLOGICAL: alert, oriented, normal speech, no focal findings or movement disorder noted    SVE: 10/100, Membranes not intact    FHR:  Twin A: 135 bpm  Twin B: 140 bpm    Lab Review  Lab Results   Component Value Date    WBC 6.5 01/09/2024    HGB 9.8 (L) 01/09/2024    HCT 30.5 (L) 01/09/2024    MCV 79 (L) 01/09/2024     01/09/2024

## 2024-01-26 NOTE — DISCHARGE INSTRUCTIONS

## 2024-01-26 NOTE — CARE PLAN
Problem: Postpartum  Goal: Experiences normal postpartum course  Outcome: Progressing  Goal: Appropriate maternal -  bonding  Outcome: Progressing  Goal: Establish and maintain infant feeding pattern for adequate nutrition  Outcome: Progressing  Goal: Incisions, wounds, or drain sites healing without S/S of infection  Outcome: Progressing  Goal: No s/sx infection  Outcome: Progressing  Goal: No s/sx of hemorrhage  Outcome: Progressing  Goal: Minimal s/sx of HDP and BP<160/110  Outcome: Progressing     Problem: Hypertensive Disorder of Pregnancy (HDP)  Goal: Minimal s/sx of HDP and BP<160/110  Outcome: Progressing  Goal: Adequate urine output (0.5 ml/kg/hr)  Outcome: Progressing

## 2024-01-26 NOTE — LACTATION NOTE
Lactation Consultant Note  Lactation Consultation  Reason for Consult: Initial assessment, Late  infant, Multiple gestation  Consultant Name: NAREN Loving    Maternal Information  Has mother  before?: Yes  Infant to breast within first 2 hours of birth?: Yes    Maternal Assessment       Infant Assessment       Feeding Assessment  Nutrition Source: Breastmilk, Donor human milk  Feeding Method: Nursing at the breast, Paced bottle  Unable to assess infant feeding at this time: Other (Comment) (infant's just fed)    LATCH TOOL       Breast Pump  Pump: Hospital grade electric pump  Frequency: 8-10 times per day  Duration: 15-20 minutes per session    Other OB Lactation Tools       Patient Follow-up  Inpatient Lactation Follow-up Needed : Yes    Other OB Lactation Documentation  Infant Risk Factors: Prematurity <37 weeks    Recommendations/Summary  Mother just finished feeding infants and pumping. She reports that baby girl is latching and baby boy is having trouble. She has been offering DHM due to difficulty with latching and  status. She is experienced with her previous children. She plans to call for next feeding attempt and would like to rest at this time.

## 2024-01-27 LAB
ERYTHROCYTE [DISTWIDTH] IN BLOOD BY AUTOMATED COUNT: 17.2 % (ref 11.5–14.5)
HCT VFR BLD AUTO: 27.1 % (ref 36–46)
HGB BLD-MCNC: 8.5 G/DL (ref 12–16)
MCH RBC QN AUTO: 26.1 PG (ref 26–34)
MCHC RBC AUTO-ENTMCNC: 31.4 G/DL (ref 32–36)
MCV RBC AUTO: 83 FL (ref 80–100)
NRBC BLD-RTO: 0 /100 WBCS (ref 0–0)
PLATELET # BLD AUTO: 181 X10*3/UL (ref 150–450)
RBC # BLD AUTO: 3.26 X10*6/UL (ref 4–5.2)
WBC # BLD AUTO: 8.1 X10*3/UL (ref 4.4–11.3)

## 2024-01-27 PROCEDURE — 2500000001 HC RX 250 WO HCPCS SELF ADMINISTERED DRUGS (ALT 637 FOR MEDICARE OP)

## 2024-01-27 PROCEDURE — 85027 COMPLETE CBC AUTOMATED: CPT

## 2024-01-27 PROCEDURE — 36415 COLL VENOUS BLD VENIPUNCTURE: CPT

## 2024-01-27 PROCEDURE — 1210000001 HC SEMI-PRIVATE ROOM DAILY

## 2024-01-27 PROCEDURE — 2500000004 HC RX 250 GENERAL PHARMACY W/ HCPCS (ALT 636 FOR OP/ED)

## 2024-01-27 RX ADMIN — ACETAMINOPHEN 975 MG: 325 TABLET ORAL at 07:31

## 2024-01-27 RX ADMIN — ENOXAPARIN SODIUM 60 MG: 100 INJECTION SUBCUTANEOUS at 07:31

## 2024-01-27 RX ADMIN — IBUPROFEN 600 MG: 600 TABLET, FILM COATED ORAL at 19:15

## 2024-01-27 RX ADMIN — POLYETHYLENE GLYCOL 3350 17 G: 17 POWDER, FOR SOLUTION ORAL at 20:08

## 2024-01-27 RX ADMIN — IBUPROFEN 600 MG: 600 TABLET, FILM COATED ORAL at 13:25

## 2024-01-27 RX ADMIN — SIMETHICONE 80 MG: 80 TABLET, CHEWABLE ORAL at 12:06

## 2024-01-27 RX ADMIN — ACETAMINOPHEN 975 MG: 325 TABLET ORAL at 13:25

## 2024-01-27 RX ADMIN — IBUPROFEN 600 MG: 600 TABLET, FILM COATED ORAL at 07:31

## 2024-01-27 RX ADMIN — POLYETHYLENE GLYCOL 3350 17 G: 17 POWDER, FOR SOLUTION ORAL at 12:06

## 2024-01-27 RX ADMIN — ACETAMINOPHEN 975 MG: 325 TABLET ORAL at 01:23

## 2024-01-27 RX ADMIN — ACETAMINOPHEN 975 MG: 325 TABLET ORAL at 19:15

## 2024-01-27 RX ADMIN — MAGNESIUM HYDROXIDE 10 ML: 400 SUSPENSION ORAL at 18:29

## 2024-01-27 RX ADMIN — KETOROLAC TROMETHAMINE 30 MG: 30 INJECTION, SOLUTION INTRAMUSCULAR; INTRAVENOUS at 01:22

## 2024-01-27 ASSESSMENT — PAIN SCALES - GENERAL
PAINLEVEL_OUTOF10: 5 - MODERATE PAIN
PAINLEVEL_OUTOF10: 5 - MODERATE PAIN
PAINLEVEL_OUTOF10: 7
PAINLEVEL_OUTOF10: 3

## 2024-01-27 ASSESSMENT — PAIN DESCRIPTION - DESCRIPTORS
DESCRIPTORS: SORE
DESCRIPTORS: CRAMPING

## 2024-01-27 NOTE — PROGRESS NOTES
Postpartum Progress Note    Assessment/Plan   Ivanna Addison is a 23 y.o., , who was admitted for PCS in s/o di/di twins with malpresentation, delivered at 36w4d gestation and is now postpartum day 1 s/p PLTCS.     Routine postpartum care  - meeting all postpartum and post-op milestones  - voiding spontaneously, tolerating PO diet  - bonding with  female, male  infants  - pain well controlled on po medications  - DVT Score: 6 - encourage ambulation,  SCDs, and  ppx lovenox  - A+, Rhogam not indicated  - admission hgb 9.1 -> POD#1 8.5  - PPBC: defers to 6 week postpartum visit     cHTN  - no meds   - BP largely normotensive, occasional low mild range   - asymptomatic   - HELLP labs negative, P:C 0.13 on   - will continue to monitor, discussed 3 day stay for gHTN and pt states understanding  - BP cuff for home     Maternal Well-Being  - emotional support provided     Feeding  - breastfeeding/pumping encouraged; lactation consult prn    Dispo:  anticipate d/c on POD #3 if BP well-controlled and meeting all postpartum milestones, for f/u 1 week for BP check, 2 weeks for incision check, and 1 month with Primary OB provider    [facility]    Principal Problem:    Delivery by  section at 37-39 weeks of gestation due to labor  Active Problems:    Labor and delivery indication for care or intervention    Pregnancy Problems (from 21 to present)       Problem Noted Resolved    Labor and delivery indication for care or intervention 2024 by Meggan Morel MD No    Priority:  Medium      Anemia affecting pregnancy in third trimester 2023 by Yady Boothe MD No    Priority:  Medium      Overview Signed 2023  2:26 PM by Yady Boothe MD     Anemia hgb 10.2, ferritin 13. Rx'd PO iron         Pelvic pain in pregnancy 2023 by Emili Roche MD No    Priority:  Medium      Overview Signed 2023 12:50 PM by Meggan Morel MD     Pregnancy support  belt          Dichorionic diamniotic twin pregnancy in third trimester 10/19/2023 by Fabiola Munoz MD No    Priority:  Medium      Overview Addendum 2023 12:51 PM by Meggan Morel MD     [ ] serial growths beginning 24-28 weeks  [ ] delivery 37-38 weeks         Gastroesophageal reflux during pregnancy in third trimester, antepartum 2023 by Varinder Rae No    Priority:  Medium      Overview Addendum 2024  1:10 PM by Gabino Dowd MD     Continue Tums and Pepcid PRN.         Prolapse of female pelvic organs 2023 by Varinder Rae No    Priority:  Medium      Overview Addendum 2023 12:06 PM by Emili Roche MD     Patient reports hx of intermittent pelvic organ prolapse during pregnancy, beginning in  pregnancy. Went on to have  x3.   - Occurs during Valsalva, usually with constipation. Previously instructed to manually reduce.   - Pt has regular soft BM's now, has miralax to use prn   - pelvic floor therapy; previously declined pessary placement per patient   [ x] Urogynecology apt  -Discussed following up postpartum for any corrective surgery. Has appt scheduled 2024         36 weeks gestation of pregnancy 2023 by Varinder Rae No    Priority:  Medium      Overview Addendum 2024 12:12 PM by Alysha Mi MD     [x] labs uptodate  [X] Anatomy US  [X] 2nd tri labs, hgb 10.2  [X] 1 hr gtt  -wnl  [X] tdap   [x] flu   [x] declined covid  [x] GBS: positive  [x] ppBC: depo   [x] breast feeding, to resend Rx today   [ ] Delivery planning: for pLTCS , baby A breech          Hypertension affecting pregnancy in third trimester 2023 by Varinder Rae No    Priority:  Medium      Overview Addendum 2023 12:50 PM by Meggan Morel MD     [x] Baseline HELLP Labs, P:C 0.05  [X] bASA  [ ]  Testing  [ ] Echo vs EKG    Serial Growths    Was on nifedipine 30 prior to pregnancy, no meds currently                  Subjective   Her  pain is well controlled with current medications  She is passing flatus  She is ambulating well  She is tolerating a Adult diet Regular  She reports no breast or nursing problems  She denies emotional concerns today      Denies HA, vision changes, RUQ pain, chest pain, or SOB.    Objective   Allergies:   Patient has no known allergies.         Last Vitals:  Temp Pulse Resp BP MAP Pulse Ox   36.2 °C (97.2 °F) 93 18 (!) 141/86 (RN aware)   98 %     Vitals Min/Max Last 24 Hours:  Temp  Min: 36.2 °C (97.2 °F)  Max: 37.1 °C (98.8 °F)  Pulse  Min: 83  Max: 102  Resp  Min: 16  Max: 20  BP  Min: 115/75  Max: 141/86    Intake/Output:     Intake/Output Summary (Last 24 hours) at 1/27/2024 1642  Last data filed at 1/27/2024 0130  Gross per 24 hour   Intake --   Output 1840 ml   Net -1840 ml       Physical Exam:  General: well appearing, well-nourished, postpartum  Obstetric: abdomen soft/non-tender, fundus firm below umbilicus, lochia light, Pfannenstiel incision c/d/i  Skin: No rashes/lesions/erythema  Neuro: A/Ox3, conversational  GI: +BS, +flatus  Respiratory: Even and unlabored on RA, LSCTA BL  Cardiovascular: RRR, normal S1, S2  Extremities: No edema, discoloration, or pain in BLE, equal and palpable DP and PT pulses    Psych: appropriate mood and affect     Lab Data:  Lab Results   Component Value Date    WBC 8.1 01/27/2024    HGB 8.5 (L) 01/27/2024    HCT 27.1 (L) 01/27/2024     01/27/2024     Lab Results   Component Value Date    GLUCOSE 104 (H) 01/26/2024     01/26/2024    K 4.1 01/26/2024     01/26/2024    CO2 18 (L) 01/26/2024    ANIONGAP 15 01/26/2024    BUN 7 01/26/2024    CREATININE 0.46 (L) 01/26/2024    EGFR >90 01/26/2024    CALCIUM 8.8 01/26/2024    ALBUMIN 2.8 (L) 01/26/2024    PROT 5.5 (L) 01/26/2024    ALKPHOS 132 (H) 01/26/2024    ALT 19 01/26/2024    AST 19 01/26/2024    BILITOT 0.3 01/26/2024

## 2024-01-27 NOTE — CARE PLAN
Problem: Postpartum  Goal: Experiences normal postpartum course  Outcome: Progressing     Problem: Postpartum  Goal: Minimal s/sx of HDP and BP<160/110  Outcome: Progressing

## 2024-01-27 NOTE — LACTATION NOTE
Lactation Consultant Note  Lactation Consultation  Reason for Consult: Initial assessment  Consultant Name: Julieta Hopkins    Maternal Information  Has mother  before?: Yes  Infant to breast within first 2 hours of birth?: No  Exclusive Pump and Bottle Feed: No    Maternal Assessment  Breast Assessment: Large, Pendulous, Symmetrical, Soft  Nipple Assessment: Intact, Erect  Areola Assessment: Normal    Infant Assessment  Infant Behavior: Sleepy    Feeding Assessment  Nutrition Source: Breastmilk, Donor human milk  Feeding Method: Nursing at the breast, Paced bottle, Feeding expressed breastmilk, Syringe feeding  Feeding Position: Football/seated, Skin to skin  Suck/Feeding: Sustained  Latch Assessment: Eagerly grasped on to latch, Latch achieved, Instructed on deep latch, Flanged lips, Correct tongue position    LATCH TOOL  Latch: Grasps breast, tongue down, lips flanged, rhythmic sucking  Audible Swallowing: Spontaneous and intermittent (24 hours old)  Type of Nipple: Everted (After stimulation)  Comfort (Breast/Nipple): Soft/non-tender  Hold (Positioning): Minimal assist, teach one side, mother does other, staff holds  LATCH Score: 9    Breast Pump  Pump: Hospital grade electric pump  Frequency: 8-10 times per day  Duration: 15-20 minutes per session  Breast Shield Size and Type: 24 mm  Units of Volume: Drops    Patient Follow-up  Inpatient Lactation Follow-up Needed : Yes  Outpatient Lactation Follow-up: Recommended    Other OB Lactation Documentation  Maternal Risk Factors: Hypertension  Infant Risk Factors: Prematurity <37 weeks    Recommendations/Summary  Assisted mom with latching her twins. She says that baby girl has latched well but baby boy has not yet latched successfully. We started with baby boy first. He was very sleepy and I could not initiate a suck on my finger. We put him in football hold at the left breast in skin to skin. I showed mom how to position him with good spinal alignment and bring  him to the breast chin first to elicit a wide open mouth. Baby would only open his mouth about snf and was not able to achieve a latch. We then worked on latching baby girl. She was also sleepy at first but woke up easily at the breast once we expressed a small amount of colostrum. She opened wide and was able to latch on the second attempt with flanged lips, areolar attachment, nose and chin touching the breast with rhythmic sucks and several audible swallows. She sustained the latch for at least 10 minutes and was continuing to feed after I left the room. I showed mom infant stimulation and breast compression techniques to keep baby awake and actively feeding at the breast. I brought in 15 mls of donor milk for each baby for parents to feed via bottle after baby girl is finished breastfeeding. Mom will then pump both breasts for 15-20 minutes. Mom has been diligent about attempting to latch her twins and pumping every 2-3 hours. Discussed benefits of skin to skin contact for mom and babies and for mom's milk production and supply. Reviewed differences between colostrum and mature milk and discussed that full milk supply will typically come in between 3-5 days postpartum. Mom has a pump for at home. We reviewed the outpatient lactation information.

## 2024-01-28 ENCOUNTER — PHARMACY VISIT (OUTPATIENT)
Dept: PHARMACY | Facility: CLINIC | Age: 24
End: 2024-01-28
Payer: MEDICAID

## 2024-01-28 VITALS
OXYGEN SATURATION: 97 % | TEMPERATURE: 99.3 F | SYSTOLIC BLOOD PRESSURE: 133 MMHG | RESPIRATION RATE: 18 BRPM | DIASTOLIC BLOOD PRESSURE: 84 MMHG | HEART RATE: 96 BPM

## 2024-01-28 LAB
BB ANTIBODY IDENTIFICATION: NORMAL
BB ANTIBODY IDENTIFICATION: NORMAL
CASE #: NORMAL
CASE #: NORMAL

## 2024-01-28 PROCEDURE — RXMED WILLOW AMBULATORY MEDICATION CHARGE

## 2024-01-28 PROCEDURE — 2500000001 HC RX 250 WO HCPCS SELF ADMINISTERED DRUGS (ALT 637 FOR MEDICARE OP)

## 2024-01-28 PROCEDURE — 2500000004 HC RX 250 GENERAL PHARMACY W/ HCPCS (ALT 636 FOR OP/ED)

## 2024-01-28 RX ORDER — ACETAMINOPHEN 325 MG/1
975 TABLET ORAL EVERY 6 HOURS PRN
Qty: 120 TABLET | Refills: 0 | Status: SHIPPED | OUTPATIENT
Start: 2024-01-28 | End: 2024-04-08 | Stop reason: WASHOUT

## 2024-01-28 RX ORDER — IBUPROFEN 600 MG/1
600 TABLET ORAL EVERY 6 HOURS PRN
Qty: 60 TABLET | Refills: 0 | Status: SHIPPED | OUTPATIENT
Start: 2024-01-28 | End: 2024-04-08 | Stop reason: WASHOUT

## 2024-01-28 RX ADMIN — IBUPROFEN 600 MG: 600 TABLET, FILM COATED ORAL at 01:35

## 2024-01-28 RX ADMIN — ACETAMINOPHEN 975 MG: 325 TABLET ORAL at 07:42

## 2024-01-28 RX ADMIN — IBUPROFEN 600 MG: 600 TABLET, FILM COATED ORAL at 07:42

## 2024-01-28 RX ADMIN — IBUPROFEN 600 MG: 600 TABLET, FILM COATED ORAL at 13:40

## 2024-01-28 RX ADMIN — ACETAMINOPHEN 975 MG: 325 TABLET ORAL at 01:34

## 2024-01-28 RX ADMIN — ACETAMINOPHEN 975 MG: 325 TABLET ORAL at 13:40

## 2024-01-28 ASSESSMENT — PAIN SCALES - GENERAL
PAINLEVEL_OUTOF10: 0 - NO PAIN
PAINLEVEL_OUTOF10: 5 - MODERATE PAIN

## 2024-01-28 NOTE — CARE PLAN
Problem: Postpartum  Goal: Experiences normal postpartum course  Outcome: Met  Goal: Appropriate maternal -  bonding  Outcome: Met  Goal: Establish and maintain infant feeding pattern for adequate nutrition  Outcome: Met  Goal: Incisions, wounds, or drain sites healing without S/S of infection  Outcome: Met  Goal: No s/sx infection  Outcome: Met  Goal: No s/sx of hemorrhage  Outcome: Met  Goal: Minimal s/sx of HDP and BP<160/110  Outcome: Met     Problem: Hypertensive Disorder of Pregnancy (HDP)  Goal: Minimal s/sx of HDP and BP<160/110  Outcome: Met  Goal: Adequate urine output (0.5 ml/kg/hr)  Outcome: Met

## 2024-01-28 NOTE — LACTATION NOTE
Lactation Consultant Note  Lactation Consultation  Reason for Consult: Follow-up assessment  Consultant Name: Lisa Barrera RN, IBCLC    Maternal Information       Maternal Assessment  Breast Assessment: Large, Pendulous, Soft, Compressible  Nipple Assessment: Intact, Erect, Large diameter  Areola Assessment: Normal    Infant Assessment  Infant Behavior: Sleepy (baby boy)    Feeding Assessment  Nutrition Source: Breastmilk, Formula (per mother’s request)  Feeding Method: Nursing at the breast, Paced bottle  Feeding Position: C - hold, Football/seated, One side per feeding, Mother needs assistance with latch/positioning  Suck/Feeding: Unsustained, Does not suckle  Latch Assessment: Too sleepy, Unable to arouse for feeding, Maximum assistance is needed, Instructed on deep latch, Areolar attachment, Wide open mouth < 160, Other (Comment) (just held the breast hen detached and I coiuldn't get baby back to thge breast- sleepy)    LATCH TOOL  Latch: Repeated attempts, hold nipple in mouth, stimulate to suck  Audible Swallowing: None  Type of Nipple: Everted (After stimulation)  Comfort (Breast/Nipple): Soft/non-tender  Hold (Positioning): Full assist, staff holds infant at breast  LATCH Score: 5    Breast Pump  Pump: Hospital grade electric pump, Double breast pumping  Frequency: 8-10 times per day  Duration: Initiate phase    Other OB Lactation Tools       Patient Follow-up  Inpatient Lactation Follow-up Needed : No  Outpatient Lactation Follow-up: Recommended  Lactation Professional - OK to Discharge: Yes    Other OB Lactation Documentation  Maternal Risk Factors: Hypertension  Infant Risk Factors: Early term birth 37-39 weeks    Recommendations/Summary  Mom wanted to attempt to latch the baby boy-   Reviewed positioning of baby, use of pillow support (in football hold),  hand placement on baby and on breast, expression of colostrum to the nipple prior to latching, latching technique, and use of breast  compression and stimulation to keep the baby feeding at the breast longer.  Deep latch obtained after a few attempts but, then the baby fell asleep and did not suckle. Eventually pushed the nipple out of the mouth and I could not get the baby to latch again to the breast.   Baby doesn't appear to be ready to feed at this time and mom verbalized the baby fed an  hour ago via bottle.       We did not try to latch the baby girl. Mom stated the girl is breast feeding better than the boy.     Mom has been supplementing the twins with donor breast milk and pumping  and she will now convert to using formula and pumping at home while continuing to work on latching.     [employer] rental pump done  for 1 week- #[id number] - she has the information of where to return the pump and where to call if she would like to renew the rental.     I ordered her a breast pump from [employer].     PI-123, Instructions on pump use, Cdc pump part cleaning guide, breast feeding booklet, and rental agreement with outpatient locations/ phone numbers listed reviewed and given to her.     Encouraged her to utilize the outpatient lactation resources, if needed.   Contact information given.   [phone] [facility] or [phone] Joanna

## 2024-01-28 NOTE — DISCHARGE SUMMARY
Postpartum Discharge Summary    Admission Date: 2024  Discharge Date: 24     Discharge Diagnosis  Problem List Items Addressed This Visit       * (Principal) Delivery by  section at 37-39 weeks of gestation due to labor    Labor and delivery indication for care or intervention - Primary     Other Visit Diagnoses        delivery delivered        Relevant Medications    acetaminophen (Tylenol) 325 mg tablet    ibuprofen 600 mg tablet             Iavnna Addison is a 23 y.o. female now  and postpartum day 2      Pregnancy notable for:  - cHTN, no meds  - Asthma  - Pelvic organ prolapse   - Anemia hgb 9.8 T&C x1  - BMI 48    Hospital Course  Admitted for urgent PCS in s/o active labor, 10 cm dilated, and di/di twins with malpresentation   Delivery Date:      Calvin AddisonaGirl [[id number]]   2024      Maritza AddisonWOMikaylaBoy [[id number]]   2024       Azael aOANGELICAikaylaGirl [[id number]]   7:00 AM      Azael bTWOMikaylaBoy [[id number]]   7:02 AM   Delivery type:      Azael aOANGELICAikaylaGirl [[id number]]   , Low Transverse      Azael bTWOMikaylaBoy [[id number]]   , Low Transverse    GA at delivery: 36w4d  Outcome:      Isiah AddisonylaGirl [[id number]]   Living      Maritza AddisonWOMikaylaBoy [[id number]]   Living   Anesthesia during delivery:      Azael PatriciaikaylaGirl [[id number]]   Spinal      Azael bTWOMikaylaBoy [[id number]]   Spinal   Intrapartum complications:      Isiah AddisonylaGirl [[id number]]   None      Azael bTWOMikaylaBoy [[id number]]   None   Feeding method: Breastfeeding Status: Yes     Delivery complications: EBL 1L - hgb 9.1 -> 8.5, for PO Fe at DC  Contraception at discharge: defers to 6 week postpartum visit     Complications Requiring Follow-Up  cHTN  - no meds   - BP largely normotensive, occasional low mild range   - asymptomatic   - HELLP labs negative, P:C 0.13 on   - BP cuff for home     Pertinent Subjective and Physical Exam At Time of Discharge  Patient seen at  bedside - doing well and no acute events overnight. Pain well-controlled on current regimen, lochia light, voiding spontaneously, passing flatus, ambulating independently, and tolerating PO.     General: well appearing, well-nourished, postpartum  Obstetric: abdomen soft/non-tender, fundus firm below umbilicus, lochia light, Pfannenstiel incision c/d/i  Skin: No rashes/lesions/erythema  Neuro: A/Ox3, conversational  GI: +BS, +flatus  Respiratory: Even and unlabored on RA, LSCTA BL  Cardiovascular: RRR, normal S1, S2  Extremities: No edema, discoloration, or pain in BLE, equal and palpable DP and PT pulses    Psych: appropriate mood and affect     Discharge Meds     Your medication list        START taking these medications        Instructions Last Dose Given Next Dose Due   acetaminophen 325 mg tablet  Commonly known as: Tylenol      Take 3 tablets (975 mg) by mouth every 6 hours if needed for mild pain (1 - 3).       ibuprofen 600 mg tablet      Take 1 tablet (600 mg) by mouth every 6 hours if needed for mild pain (1 - 3).              CONTINUE taking these medications        Instructions Last Dose Given Next Dose Due   [employer] Blood Pressure Monitr kit  Generic drug: blood pressure test kit-large      1 kit 2 times a day.       famotidine 20 mg tablet  Commonly known as: Pepcid      Take 1 tablet (20 mg) by mouth 2 times a day.       ferrous gluconate 324 (38 Fe) mg tablet  Commonly known as: Fergon      Take 1 tablet (38 mg of iron) by mouth every other day.       Se--19 29 mg iron- 1 mg tablet  Generic drug: -iron fum-folic acid      TAKE 1 TABLET DAILY.       Unisom (doxylamine) 25 mg tablet  Generic drug: doxylamine      TAKE 1 TABLET ONCE DAILY AT BEDTIME ALONG WITH VITAMIN B6 FOR NAUSEA AND VOMITING       Unisom SleepMinis 25 mg capsule  Generic drug: diphenhydrAMINE           Vitamin B-6 25 mg tablet  Generic drug: pyridoxine      TAKE 1 TABLET BY MOUTH EVERY 6 HOURS              STOP taking  these medications      aspirin 81 mg EC tablet                  Where to Get Your Medications        These medications were sent to [facility]  [address], [address], [facility] [address]      Hours: 8AM to 6PM Mon-Fri, 8AM to 4PM Sat, 9AM to 1PM Sun Phone: [phone]   acetaminophen 325 mg tablet  ibuprofen 600 mg tablet          Test Results Pending At Discharge  Pending Labs       Order Current Status    Path Review-Immunohematology In process    Path Review-Immunohematology In process    Surgical Pathology Exam - [facility] In process            Outpatient Follow-Up  Future Appointments   Date Time Provider Department Center   2/1/2024  8:30 AM [place] [facility] [id number]   5/7/2024  9:00 AM Mark Singer MD [facility] order placed to schedule 2-5 days for BP check, 2 weeks for incision check, and 4-6 weeks for routine postpartum visit  with MFM team at [place].    Moni Pedersen PA-C  01/28/24 10:56 AM  [place]

## 2024-01-29 ENCOUNTER — ANESTHESIA (OUTPATIENT)
Dept: OBSTETRICS AND GYNECOLOGY | Facility: HOSPITAL | Age: 24
End: 2024-01-29
Payer: COMMERCIAL

## 2024-01-29 LAB
BLOOD EXPIRATION DATE: NORMAL
BLOOD EXPIRATION DATE: NORMAL
DISPENSE STATUS: NORMAL
DISPENSE STATUS: NORMAL
PRODUCT BLOOD TYPE: 6200
PRODUCT BLOOD TYPE: 6200
PRODUCT CODE: NORMAL
PRODUCT CODE: NORMAL
UNIT ABO: NORMAL
UNIT ABO: NORMAL
UNIT NUMBER: NORMAL
UNIT NUMBER: NORMAL
UNIT RH: NORMAL
UNIT RH: NORMAL
UNIT VOLUME: 350
UNIT VOLUME: 350
XM INTEP: NORMAL
XM INTEP: NORMAL

## 2024-01-30 LAB
LABORATORY COMMENT REPORT: NORMAL
PATH REPORT.COMMENTS IMP SPEC: NORMAL
PATH REPORT.FINAL DX SPEC: NORMAL
PATH REPORT.GROSS SPEC: NORMAL
PATH REPORT.RELEVANT HX SPEC: NORMAL
PATH REPORT.TOTAL CANCER: NORMAL

## 2024-02-01 ENCOUNTER — APPOINTMENT (OUTPATIENT)
Dept: INFUSION THERAPY | Facility: HOSPITAL | Age: 24
End: 2024-02-01
Payer: COMMERCIAL

## 2024-02-01 LAB
PATH REV-IMMUNOHEMATOLOGY-PR30: NORMAL
PATH REV-IMMUNOHEMATOLOGY-PR30: NORMAL

## 2024-02-02 ENCOUNTER — CLINICAL SUPPORT (OUTPATIENT)
Dept: OBSTETRICS AND GYNECOLOGY | Facility: CLINIC | Age: 24
End: 2024-02-02
Payer: COMMERCIAL

## 2024-02-02 VITALS
WEIGHT: 293 LBS | HEART RATE: 98 BPM | BODY MASS INDEX: 46.67 KG/M2 | DIASTOLIC BLOOD PRESSURE: 93 MMHG | SYSTOLIC BLOOD PRESSURE: 134 MMHG

## 2024-02-02 NOTE — PROGRESS NOTES
Here for pp BP check   DOD 1/26/24 C/S di di twins with malpresentation.  Twins are at home.  Support from father  of the twins and her mother.  cHTN on no meds  Has bp cuff at home.  Reports continued headaches once the ibuprofen  or tylenol wears off.  Pos bilateral tibial and ankle edema  pitting +3  Reports home BP 2/1/ 156/95  1/31/ 158/?  Today 134/93 and had a headache this am 0530  which she took tyl /motrin but feeling headache is coming back without medication   Last noted hgb 8.5   Discussed pt with Dr Allen  Pt to present to triage- pt can go after  her daughter gets out of school at 230 pm  Pt agrees and aware that if twins come to triage she needs an adult family member to be there.  Report given to mac 2 triage rn

## 2024-02-08 ENCOUNTER — HOSPITAL ENCOUNTER (OUTPATIENT)
Facility: HOSPITAL | Age: 24
Setting detail: OBSERVATION
Discharge: HOME | DRG: 776 | End: 2024-02-09
Attending: OBSTETRICS & GYNECOLOGY | Admitting: OBSTETRICS & GYNECOLOGY
Payer: COMMERCIAL

## 2024-02-08 ENCOUNTER — APPOINTMENT (OUTPATIENT)
Dept: CARDIOLOGY | Facility: HOSPITAL | Age: 24
End: 2024-02-08
Payer: COMMERCIAL

## 2024-02-08 LAB
ALBUMIN SERPL BCP-MCNC: 3.3 G/DL (ref 3.4–5)
ALP SERPL-CCNC: 107 U/L (ref 33–110)
ALT SERPL W P-5'-P-CCNC: 21 U/L (ref 7–45)
ANION GAP SERPL CALC-SCNC: 12 MMOL/L (ref 10–20)
AST SERPL W P-5'-P-CCNC: 22 U/L (ref 9–39)
BASOPHILS # BLD AUTO: 0.02 X10*3/UL (ref 0–0.1)
BASOPHILS NFR BLD AUTO: 0.4 %
BILIRUB SERPL-MCNC: 0.3 MG/DL (ref 0–1.2)
BNP SERPL-MCNC: 80 PG/ML (ref 0–99)
BUN SERPL-MCNC: 9 MG/DL (ref 6–23)
CALCIUM SERPL-MCNC: 9 MG/DL (ref 8.6–10.6)
CHLORIDE SERPL-SCNC: 108 MMOL/L (ref 98–107)
CO2 SERPL-SCNC: 26 MMOL/L (ref 21–32)
CREAT SERPL-MCNC: 0.66 MG/DL (ref 0.5–1.05)
EGFRCR SERPLBLD CKD-EPI 2021: >90 ML/MIN/1.73M*2
EOSINOPHIL # BLD AUTO: 0.27 X10*3/UL (ref 0–0.7)
EOSINOPHIL NFR BLD AUTO: 5.1 %
ERYTHROCYTE [DISTWIDTH] IN BLOOD BY AUTOMATED COUNT: 16.9 % (ref 11.5–14.5)
GLUCOSE SERPL-MCNC: 82 MG/DL (ref 74–99)
HCT VFR BLD AUTO: 27.3 % (ref 36–46)
HGB BLD-MCNC: 8.2 G/DL (ref 12–16)
IMM GRANULOCYTES # BLD AUTO: 0.02 X10*3/UL (ref 0–0.7)
IMM GRANULOCYTES NFR BLD AUTO: 0.4 % (ref 0–0.9)
LYMPHOCYTES # BLD AUTO: 1.99 X10*3/UL (ref 1.2–4.8)
LYMPHOCYTES NFR BLD AUTO: 37.7 %
MCH RBC QN AUTO: 25 PG (ref 26–34)
MCHC RBC AUTO-ENTMCNC: 30 G/DL (ref 32–36)
MCV RBC AUTO: 83 FL (ref 80–100)
MONOCYTES # BLD AUTO: 0.35 X10*3/UL (ref 0.1–1)
MONOCYTES NFR BLD AUTO: 6.6 %
NEUTROPHILS # BLD AUTO: 2.63 X10*3/UL (ref 1.2–7.7)
NEUTROPHILS NFR BLD AUTO: 49.8 %
NRBC BLD-RTO: 0 /100 WBCS (ref 0–0)
PLATELET # BLD AUTO: 366 X10*3/UL (ref 150–450)
POTASSIUM SERPL-SCNC: 4 MMOL/L (ref 3.5–5.3)
PROT SERPL-MCNC: 6.7 G/DL (ref 6.4–8.2)
RBC # BLD AUTO: 3.28 X10*6/UL (ref 4–5.2)
SODIUM SERPL-SCNC: 142 MMOL/L (ref 136–145)
WBC # BLD AUTO: 5.3 X10*3/UL (ref 4.4–11.3)

## 2024-02-08 PROCEDURE — 1120000001 HC OB PRIVATE ROOM DAILY

## 2024-02-08 PROCEDURE — G0378 HOSPITAL OBSERVATION PER HR: HCPCS

## 2024-02-08 PROCEDURE — 83880 ASSAY OF NATRIURETIC PEPTIDE: CPT | Performed by: STUDENT IN AN ORGANIZED HEALTH CARE EDUCATION/TRAINING PROGRAM

## 2024-02-08 PROCEDURE — 96361 HYDRATE IV INFUSION ADD-ON: CPT

## 2024-02-08 PROCEDURE — 2500000004 HC RX 250 GENERAL PHARMACY W/ HCPCS (ALT 636 FOR OP/ED): Performed by: STUDENT IN AN ORGANIZED HEALTH CARE EDUCATION/TRAINING PROGRAM

## 2024-02-08 PROCEDURE — 93010 ELECTROCARDIOGRAM REPORT: CPT | Performed by: INTERNAL MEDICINE

## 2024-02-08 PROCEDURE — 99223 1ST HOSP IP/OBS HIGH 75: CPT | Performed by: OBSTETRICS & GYNECOLOGY

## 2024-02-08 PROCEDURE — 85025 COMPLETE CBC W/AUTO DIFF WBC: CPT | Performed by: OBSTETRICS & GYNECOLOGY

## 2024-02-08 PROCEDURE — 96376 TX/PRO/DX INJ SAME DRUG ADON: CPT

## 2024-02-08 PROCEDURE — 2500000004 HC RX 250 GENERAL PHARMACY W/ HCPCS (ALT 636 FOR OP/ED): Performed by: OBSTETRICS & GYNECOLOGY

## 2024-02-08 PROCEDURE — 99213 OFFICE O/P EST LOW 20 MIN: CPT | Mod: 25

## 2024-02-08 PROCEDURE — 96375 TX/PRO/DX INJ NEW DRUG ADDON: CPT

## 2024-02-08 PROCEDURE — 36415 COLL VENOUS BLD VENIPUNCTURE: CPT | Performed by: OBSTETRICS & GYNECOLOGY

## 2024-02-08 PROCEDURE — 80053 COMPREHEN METABOLIC PANEL: CPT | Performed by: OBSTETRICS & GYNECOLOGY

## 2024-02-08 PROCEDURE — 93005 ELECTROCARDIOGRAM TRACING: CPT

## 2024-02-08 RX ORDER — NIFEDIPINE 60 MG/1
60 TABLET, FILM COATED, EXTENDED RELEASE ORAL
Status: DISCONTINUED | OUTPATIENT
Start: 2024-02-09 | End: 2024-02-09

## 2024-02-08 RX ORDER — DIPHENHYDRAMINE HYDROCHLORIDE 50 MG/ML
25 INJECTION INTRAMUSCULAR; INTRAVENOUS EVERY 6 HOURS PRN
Status: DISCONTINUED | OUTPATIENT
Start: 2024-02-08 | End: 2024-02-10 | Stop reason: HOSPADM

## 2024-02-08 RX ORDER — SODIUM CHLORIDE, SODIUM LACTATE, POTASSIUM CHLORIDE, CALCIUM CHLORIDE 600; 310; 30; 20 MG/100ML; MG/100ML; MG/100ML; MG/100ML
75 INJECTION, SOLUTION INTRAVENOUS CONTINUOUS
Status: DISCONTINUED | OUTPATIENT
Start: 2024-02-08 | End: 2024-02-10 | Stop reason: HOSPADM

## 2024-02-08 RX ORDER — ONDANSETRON 4 MG/1
4 TABLET, FILM COATED ORAL EVERY 6 HOURS PRN
Status: DISCONTINUED | OUTPATIENT
Start: 2024-02-08 | End: 2024-02-10 | Stop reason: HOSPADM

## 2024-02-08 RX ORDER — POLYETHYLENE GLYCOL 3350 17 G/17G
17 POWDER, FOR SOLUTION ORAL 2 TIMES DAILY PRN
Status: DISCONTINUED | OUTPATIENT
Start: 2024-02-08 | End: 2024-02-10 | Stop reason: HOSPADM

## 2024-02-08 RX ORDER — ONDANSETRON HYDROCHLORIDE 2 MG/ML
4 INJECTION, SOLUTION INTRAVENOUS EVERY 6 HOURS PRN
Status: DISCONTINUED | OUTPATIENT
Start: 2024-02-08 | End: 2024-02-10 | Stop reason: HOSPADM

## 2024-02-08 RX ORDER — CALCIUM GLUCONATE 98 MG/ML
1 INJECTION, SOLUTION INTRAVENOUS ONCE AS NEEDED
Status: DISCONTINUED | OUTPATIENT
Start: 2024-02-08 | End: 2024-02-10 | Stop reason: HOSPADM

## 2024-02-08 RX ORDER — NIFEDIPINE 30 MG/1
30 TABLET, FILM COATED, EXTENDED RELEASE ORAL ONCE
Status: COMPLETED | OUTPATIENT
Start: 2024-02-08 | End: 2024-02-08

## 2024-02-08 RX ORDER — METOCLOPRAMIDE 10 MG/1
10 TABLET ORAL EVERY 6 HOURS PRN
Status: DISCONTINUED | OUTPATIENT
Start: 2024-02-08 | End: 2024-02-10 | Stop reason: HOSPADM

## 2024-02-08 RX ORDER — ADHESIVE BANDAGE
10 BANDAGE TOPICAL
Status: DISCONTINUED | OUTPATIENT
Start: 2024-02-08 | End: 2024-02-10 | Stop reason: HOSPADM

## 2024-02-08 RX ORDER — NIFEDIPINE 30 MG/1
30 TABLET, FILM COATED, EXTENDED RELEASE ORAL
Status: DISCONTINUED | OUTPATIENT
Start: 2024-02-09 | End: 2024-02-08

## 2024-02-08 RX ORDER — SIMETHICONE 80 MG
80 TABLET,CHEWABLE ORAL 4 TIMES DAILY PRN
Status: DISCONTINUED | OUTPATIENT
Start: 2024-02-08 | End: 2024-02-10 | Stop reason: HOSPADM

## 2024-02-08 RX ORDER — MAGNESIUM SULFATE HEPTAHYDRATE 40 MG/ML
2 INJECTION, SOLUTION INTRAVENOUS CONTINUOUS
Status: DISCONTINUED | OUTPATIENT
Start: 2024-02-08 | End: 2024-02-10 | Stop reason: HOSPADM

## 2024-02-08 RX ORDER — METOCLOPRAMIDE HYDROCHLORIDE 5 MG/ML
10 INJECTION INTRAMUSCULAR; INTRAVENOUS EVERY 6 HOURS PRN
Status: DISCONTINUED | OUTPATIENT
Start: 2024-02-08 | End: 2024-02-10 | Stop reason: HOSPADM

## 2024-02-08 RX ORDER — HYDRALAZINE HYDROCHLORIDE 20 MG/ML
5 INJECTION INTRAMUSCULAR; INTRAVENOUS ONCE AS NEEDED
Status: DISCONTINUED | OUTPATIENT
Start: 2024-02-08 | End: 2024-02-10 | Stop reason: HOSPADM

## 2024-02-08 RX ORDER — NIFEDIPINE 10 MG/1
10 CAPSULE ORAL ONCE AS NEEDED
Status: DISCONTINUED | OUTPATIENT
Start: 2024-02-08 | End: 2024-02-10 | Stop reason: HOSPADM

## 2024-02-08 RX ORDER — BISACODYL 10 MG/1
10 SUPPOSITORY RECTAL DAILY PRN
Status: DISCONTINUED | OUTPATIENT
Start: 2024-02-08 | End: 2024-02-10 | Stop reason: HOSPADM

## 2024-02-08 RX ORDER — ENOXAPARIN SODIUM 100 MG/ML
60 INJECTION SUBCUTANEOUS EVERY 24 HOURS
Status: DISCONTINUED | OUTPATIENT
Start: 2024-02-09 | End: 2024-02-10 | Stop reason: HOSPADM

## 2024-02-08 RX ORDER — LABETALOL HYDROCHLORIDE 5 MG/ML
20 INJECTION, SOLUTION INTRAVENOUS ONCE AS NEEDED
Status: DISCONTINUED | OUTPATIENT
Start: 2024-02-08 | End: 2024-02-10 | Stop reason: HOSPADM

## 2024-02-08 RX ADMIN — SODIUM CHLORIDE, POTASSIUM CHLORIDE, SODIUM LACTATE AND CALCIUM CHLORIDE 75 ML/HR: 600; 310; 30; 20 INJECTION, SOLUTION INTRAVENOUS at 21:13

## 2024-02-08 RX ADMIN — MAGNESIUM SULFATE HEPTAHYDRATE 2 G/HR: 40 INJECTION, SOLUTION INTRAVENOUS at 21:29

## 2024-02-08 RX ADMIN — DIPHENHYDRAMINE HYDROCHLORIDE 25 MG: 50 INJECTION INTRAMUSCULAR; INTRAVENOUS at 21:55

## 2024-02-08 RX ADMIN — NIFEDIPINE 30 MG: 30 TABLET, FILM COATED, EXTENDED RELEASE ORAL at 21:17

## 2024-02-08 RX ADMIN — METOCLOPRAMIDE 10 MG: 5 INJECTION, SOLUTION INTRAMUSCULAR; INTRAVENOUS at 21:53

## 2024-02-08 SDOH — SOCIAL STABILITY: SOCIAL INSECURITY: ARE YOU OR HAVE YOU BEEN THREATENED OR ABUSED PHYSICALLY, EMOTIONALLY, OR SEXUALLY BY ANYONE?: NO

## 2024-02-08 SDOH — SOCIAL STABILITY: SOCIAL INSECURITY: DOES ANYONE TRY TO KEEP YOU FROM HAVING/CONTACTING OTHER FRIENDS OR DOING THINGS OUTSIDE YOUR HOME?: NO

## 2024-02-08 SDOH — HEALTH STABILITY: MENTAL HEALTH: HAVE YOU USED ANY PRESCRIPTION DRUGS OTHER THAN PRESCRIBED IN THE PAST 12 MONTHS?: NO

## 2024-02-08 SDOH — ECONOMIC STABILITY: HOUSING INSECURITY: DO YOU FEEL UNSAFE GOING BACK TO THE PLACE WHERE YOU ARE LIVING?: NO

## 2024-02-08 SDOH — SOCIAL STABILITY: SOCIAL INSECURITY: VERBAL ABUSE: DENIES

## 2024-02-08 SDOH — SOCIAL STABILITY: SOCIAL INSECURITY: HAS ANYONE EVER THREATENED TO HURT YOUR FAMILY OR YOUR PETS?: NO

## 2024-02-08 SDOH — SOCIAL STABILITY: SOCIAL INSECURITY: PHYSICAL ABUSE: DENIES

## 2024-02-08 SDOH — HEALTH STABILITY: MENTAL HEALTH: WERE YOU ABLE TO COMPLETE ALL THE BEHAVIORAL HEALTH SCREENINGS?: YES

## 2024-02-08 SDOH — SOCIAL STABILITY: SOCIAL INSECURITY: DO YOU FEEL ANYONE HAS EXPLOITED OR TAKEN ADVANTAGE OF YOU FINANCIALLY OR OF YOUR PERSONAL PROPERTY?: NO

## 2024-02-08 SDOH — HEALTH STABILITY: MENTAL HEALTH: NON-SPECIFIC ACTIVE SUICIDAL THOUGHTS (PAST 1 MONTH): NO

## 2024-02-08 SDOH — HEALTH STABILITY: MENTAL HEALTH: SUICIDAL BEHAVIOR (LIFETIME): NO

## 2024-02-08 SDOH — SOCIAL STABILITY: SOCIAL INSECURITY: ARE THERE ANY APPARENT SIGNS OF INJURIES/BEHAVIORS THAT COULD BE RELATED TO ABUSE/NEGLECT?: NO

## 2024-02-08 SDOH — SOCIAL STABILITY: SOCIAL INSECURITY: ABUSE SCREEN: ADULT

## 2024-02-08 SDOH — SOCIAL STABILITY: SOCIAL INSECURITY: HAVE YOU HAD THOUGHTS OF HARMING ANYONE ELSE?: NO

## 2024-02-08 SDOH — HEALTH STABILITY: MENTAL HEALTH: WISH TO BE DEAD (PAST 1 MONTH): NO

## 2024-02-08 SDOH — HEALTH STABILITY: MENTAL HEALTH: HAVE YOU USED ANY SUBSTANCES (CANABIS, COCAINE, HEROIN, HALLUCINOGENS, INHALANTS, ETC.) IN THE PAST 12 MONTHS?: NO

## 2024-02-08 ASSESSMENT — LIFESTYLE VARIABLES
AUDIT-C TOTAL SCORE: 0
AUDIT-C TOTAL SCORE: 0
HOW OFTEN DO YOU HAVE 6 OR MORE DRINKS ON ONE OCCASION: NEVER
AUDIT-C TOTAL SCORE: 0
HOW MANY STANDARD DRINKS CONTAINING ALCOHOL DO YOU HAVE ON A TYPICAL DAY: PATIENT DOES NOT DRINK
HOW OFTEN DO YOU HAVE A DRINK CONTAINING ALCOHOL: NEVER
HOW OFTEN DO YOU HAVE 6 OR MORE DRINKS ON ONE OCCASION: NEVER
AUDIT-C TOTAL SCORE: 0
HOW OFTEN DO YOU HAVE A DRINK CONTAINING ALCOHOL: NEVER
SKIP TO QUESTIONS 9-10: 1
SKIP TO QUESTIONS 9-10: 1
HOW MANY STANDARD DRINKS CONTAINING ALCOHOL DO YOU HAVE ON A TYPICAL DAY: PATIENT DOES NOT DRINK

## 2024-02-08 ASSESSMENT — PATIENT HEALTH QUESTIONNAIRE - PHQ9
2. FEELING DOWN, DEPRESSED OR HOPELESS: NOT AT ALL
1. LITTLE INTEREST OR PLEASURE IN DOING THINGS: NOT AT ALL
SUM OF ALL RESPONSES TO PHQ9 QUESTIONS 1 & 2: 0
2. FEELING DOWN, DEPRESSED OR HOPELESS: NOT AT ALL
SUM OF ALL RESPONSES TO PHQ9 QUESTIONS 1 & 2: 0
1. LITTLE INTEREST OR PLEASURE IN DOING THINGS: NOT AT ALL

## 2024-02-08 ASSESSMENT — PAIN SCALES - GENERAL: PAINLEVEL: 10 - WORST POSSIBLE PAIN

## 2024-02-08 ASSESSMENT — ACTIVITIES OF DAILY LIVING (ADL): LACK_OF_TRANSPORTATION: NO

## 2024-02-08 ASSESSMENT — PAIN DESCRIPTION - DESCRIPTORS: DESCRIPTORS: ACHING

## 2024-02-09 ENCOUNTER — PHARMACY VISIT (OUTPATIENT)
Dept: PHARMACY | Facility: CLINIC | Age: 24
End: 2024-02-09
Payer: MEDICAID

## 2024-02-09 VITALS
HEIGHT: 67 IN | TEMPERATURE: 98.2 F | HEART RATE: 83 BPM | OXYGEN SATURATION: 98 % | WEIGHT: 293 LBS | BODY MASS INDEX: 45.99 KG/M2 | SYSTOLIC BLOOD PRESSURE: 123 MMHG | RESPIRATION RATE: 14 BRPM | DIASTOLIC BLOOD PRESSURE: 71 MMHG

## 2024-02-09 LAB
ABO GROUP (TYPE) IN BLOOD: NORMAL
ALBUMIN SERPL BCP-MCNC: 3.6 G/DL (ref 3.4–5)
ALP SERPL-CCNC: 117 U/L (ref 33–110)
ALT SERPL W P-5'-P-CCNC: 22 U/L (ref 7–45)
ANION GAP SERPL CALC-SCNC: 17 MMOL/L (ref 10–20)
ANTIBODY SCREEN: NORMAL
AST SERPL W P-5'-P-CCNC: 19 U/L (ref 9–39)
BILIRUB SERPL-MCNC: 0.3 MG/DL (ref 0–1.2)
BUN SERPL-MCNC: 7 MG/DL (ref 6–23)
CALCIUM SERPL-MCNC: 8.9 MG/DL (ref 8.6–10.6)
CHLORIDE SERPL-SCNC: 106 MMOL/L (ref 98–107)
CO2 SERPL-SCNC: 25 MMOL/L (ref 21–32)
CREAT SERPL-MCNC: 0.62 MG/DL (ref 0.5–1.05)
EGFRCR SERPLBLD CKD-EPI 2021: >90 ML/MIN/1.73M*2
ERYTHROCYTE [DISTWIDTH] IN BLOOD BY AUTOMATED COUNT: 16.7 % (ref 11.5–14.5)
GLUCOSE SERPL-MCNC: 96 MG/DL (ref 74–99)
HCT VFR BLD AUTO: 31.2 % (ref 36–46)
HGB BLD-MCNC: 9.6 G/DL (ref 12–16)
MCH RBC QN AUTO: 25.8 PG (ref 26–34)
MCHC RBC AUTO-ENTMCNC: 30.8 G/DL (ref 32–36)
MCV RBC AUTO: 84 FL (ref 80–100)
NRBC BLD-RTO: 0 /100 WBCS (ref 0–0)
PLATELET # BLD AUTO: 441 X10*3/UL (ref 150–450)
POTASSIUM SERPL-SCNC: 3.7 MMOL/L (ref 3.5–5.3)
PROT SERPL-MCNC: 7.3 G/DL (ref 6.4–8.2)
RBC # BLD AUTO: 3.72 X10*6/UL (ref 4–5.2)
RH FACTOR (ANTIGEN D): NORMAL
SODIUM SERPL-SCNC: 144 MMOL/L (ref 136–145)
WBC # BLD AUTO: 5.8 X10*3/UL (ref 4.4–11.3)

## 2024-02-09 PROCEDURE — 99239 HOSP IP/OBS DSCHRG MGMT >30: CPT | Performed by: STUDENT IN AN ORGANIZED HEALTH CARE EDUCATION/TRAINING PROGRAM

## 2024-02-09 PROCEDURE — RXMED WILLOW AMBULATORY MEDICATION CHARGE

## 2024-02-09 PROCEDURE — 99199 UNLISTED SPECIAL SVC PX/RPRT: CPT

## 2024-02-09 PROCEDURE — G0378 HOSPITAL OBSERVATION PER HR: HCPCS

## 2024-02-09 PROCEDURE — 2500000001 HC RX 250 WO HCPCS SELF ADMINISTERED DRUGS (ALT 637 FOR MEDICARE OP): Performed by: STUDENT IN AN ORGANIZED HEALTH CARE EDUCATION/TRAINING PROGRAM

## 2024-02-09 PROCEDURE — 85027 COMPLETE CBC AUTOMATED: CPT | Performed by: STUDENT IN AN ORGANIZED HEALTH CARE EDUCATION/TRAINING PROGRAM

## 2024-02-09 PROCEDURE — 96366 THER/PROPH/DIAG IV INF ADDON: CPT

## 2024-02-09 PROCEDURE — 2500000004 HC RX 250 GENERAL PHARMACY W/ HCPCS (ALT 636 FOR OP/ED): Performed by: OBSTETRICS & GYNECOLOGY

## 2024-02-09 PROCEDURE — 96365 THER/PROPH/DIAG IV INF INIT: CPT

## 2024-02-09 PROCEDURE — 80053 COMPREHEN METABOLIC PANEL: CPT | Performed by: STUDENT IN AN ORGANIZED HEALTH CARE EDUCATION/TRAINING PROGRAM

## 2024-02-09 PROCEDURE — 2500000004 HC RX 250 GENERAL PHARMACY W/ HCPCS (ALT 636 FOR OP/ED): Performed by: STUDENT IN AN ORGANIZED HEALTH CARE EDUCATION/TRAINING PROGRAM

## 2024-02-09 PROCEDURE — 86850 RBC ANTIBODY SCREEN: CPT | Performed by: STUDENT IN AN ORGANIZED HEALTH CARE EDUCATION/TRAINING PROGRAM

## 2024-02-09 PROCEDURE — 36415 COLL VENOUS BLD VENIPUNCTURE: CPT | Performed by: STUDENT IN AN ORGANIZED HEALTH CARE EDUCATION/TRAINING PROGRAM

## 2024-02-09 PROCEDURE — 86901 BLOOD TYPING SEROLOGIC RH(D): CPT | Performed by: STUDENT IN AN ORGANIZED HEALTH CARE EDUCATION/TRAINING PROGRAM

## 2024-02-09 RX ORDER — NIFEDIPINE 30 MG/1
30 TABLET, FILM COATED, EXTENDED RELEASE ORAL ONCE
Status: DISCONTINUED | OUTPATIENT
Start: 2024-02-09 | End: 2024-02-09

## 2024-02-09 RX ORDER — NIFEDIPINE 60 MG/1
60 TABLET, FILM COATED, EXTENDED RELEASE ORAL
Status: DISCONTINUED | OUTPATIENT
Start: 2024-02-10 | End: 2024-02-10 | Stop reason: HOSPADM

## 2024-02-09 RX ORDER — NIFEDIPINE 60 MG/1
60 TABLET, FILM COATED, EXTENDED RELEASE ORAL
Qty: 60 TABLET | Refills: 1 | Status: SHIPPED | OUTPATIENT
Start: 2024-02-10 | End: 2024-02-27 | Stop reason: SDUPTHER

## 2024-02-09 RX ORDER — NIFEDIPINE 60 MG/1
60 TABLET, FILM COATED, EXTENDED RELEASE ORAL
Qty: 90 TABLET | Refills: 1 | Status: SHIPPED | OUTPATIENT
Start: 2024-02-10 | End: 2024-02-09 | Stop reason: SDUPTHER

## 2024-02-09 RX ORDER — IBUPROFEN 600 MG/1
600 TABLET ORAL EVERY 6 HOURS SCHEDULED
Status: DISCONTINUED | OUTPATIENT
Start: 2024-02-09 | End: 2024-02-10 | Stop reason: HOSPADM

## 2024-02-09 RX ORDER — FERROUS SULFATE 325(65) MG
325 TABLET ORAL
Qty: 90 TABLET | Refills: 1 | Status: SHIPPED | OUTPATIENT
Start: 2024-02-09 | End: 2024-04-08 | Stop reason: SDUPTHER

## 2024-02-09 RX ORDER — ACETAMINOPHEN 325 MG/1
975 TABLET ORAL EVERY 6 HOURS
Status: DISCONTINUED | OUTPATIENT
Start: 2024-02-09 | End: 2024-02-10 | Stop reason: HOSPADM

## 2024-02-09 RX ORDER — OXYCODONE HYDROCHLORIDE 5 MG/1
5 TABLET ORAL EVERY 6 HOURS PRN
Status: DISCONTINUED | OUTPATIENT
Start: 2024-02-09 | End: 2024-02-10 | Stop reason: HOSPADM

## 2024-02-09 RX ORDER — NIFEDIPINE 90 MG/1
90 TABLET, EXTENDED RELEASE ORAL
Status: DISCONTINUED | OUTPATIENT
Start: 2024-02-10 | End: 2024-02-09

## 2024-02-09 RX ORDER — NIFEDIPINE 90 MG/1
90 TABLET, EXTENDED RELEASE ORAL
Qty: 90 TABLET | Refills: 3 | Status: SHIPPED | OUTPATIENT
Start: 2024-02-10 | End: 2024-02-09 | Stop reason: HOSPADM

## 2024-02-09 RX ADMIN — MAGNESIUM SULFATE HEPTAHYDRATE 2 G/HR: 40 INJECTION, SOLUTION INTRAVENOUS at 14:33

## 2024-02-09 RX ADMIN — NIFEDIPINE 60 MG: 60 TABLET, FILM COATED, EXTENDED RELEASE ORAL at 06:59

## 2024-02-09 RX ADMIN — MAGNESIUM SULFATE HEPTAHYDRATE 2 G/HR: 40 INJECTION, SOLUTION INTRAVENOUS at 04:59

## 2024-02-09 RX ADMIN — ACETAMINOPHEN 975 MG: 325 TABLET ORAL at 17:54

## 2024-02-09 RX ADMIN — IBUPROFEN 600 MG: 600 TABLET, FILM COATED ORAL at 04:49

## 2024-02-09 RX ADMIN — ACETAMINOPHEN 975 MG: 325 TABLET ORAL at 10:47

## 2024-02-09 RX ADMIN — SODIUM CHLORIDE, POTASSIUM CHLORIDE, SODIUM LACTATE AND CALCIUM CHLORIDE 75 ML/HR: 600; 310; 30; 20 INJECTION, SOLUTION INTRAVENOUS at 09:00

## 2024-02-09 RX ADMIN — ACETAMINOPHEN 975 MG: 325 TABLET ORAL at 04:50

## 2024-02-09 RX ADMIN — IBUPROFEN 600 MG: 600 TABLET, FILM COATED ORAL at 10:49

## 2024-02-09 RX ADMIN — IBUPROFEN 600 MG: 600 TABLET, FILM COATED ORAL at 17:54

## 2024-02-09 ASSESSMENT — PAIN DESCRIPTION - LOCATION: LOCATION: ABDOMEN

## 2024-02-09 ASSESSMENT — PAIN SCALES - GENERAL
PAINLEVEL_OUTOF10: 0 - NO PAIN
PAINLEVEL_OUTOF10: 3
PAINLEVEL_OUTOF10: 0 - NO PAIN
PAINLEVEL_OUTOF10: 0 - NO PAIN
PAINLEVEL_OUTOF10: 3

## 2024-02-09 ASSESSMENT — PAIN DESCRIPTION - DESCRIPTORS
DESCRIPTORS: ACHING;DULL
DESCRIPTORS: DULL

## 2024-02-09 ASSESSMENT — PAIN - FUNCTIONAL ASSESSMENT: PAIN_FUNCTIONAL_ASSESSMENT: 0-10

## 2024-02-09 NOTE — HOSPITAL COURSE
Patient was admitted on 2/8 PM for severe unrelenting headache and severe range Blood pressure at home. Previously with cHTN not on meds. On arrival her headache was unresponsive to medications and Bps were high mild range. She was diagnosed with superimposed preeclampsia with severe features and started on Nifed 60 and magnesium infusion. She received 24hrs of magnesium and blood pressures remained well controlled on Nifed 60. She was discharged home on nifed 60 with BP check scheduled next week and post partum visit in 2 weeks

## 2024-02-09 NOTE — PROGRESS NOTES
Postpartum Progress Note    Assessment/Plan   Ivanna Addison is a 23 y.o., , who delivered at 36w4d gestation and is now postpartum day 14. S/p pLTCS on , admitted for postpartum siPEC.     siPEC with severe freatures  - dx by unrelenting HA, pt also with severe range at home and high mild range BPs on admission  - HA now resolved asymptomatic currently  - Current BP regimen: nifed 60,  normotensive currently  - continue Mg at 2g/hr until 2100 this evening, no sxs toxicity  - HELLP labs neg x2   - UOP adequate    S/p CS on , postpartum care  - pain well controlled with current regimen  - Hgb stable at 9.6, recommend PO Fe at discharge   - tolerating regular diet   - pumping   - continue routine postpartum care    Dispo: Patient strongly desires discharge after 24hrs Mag infusion is complete. Discussed would recommend discharge 2/10 AM if Bps remain well controlled, can readdress this evening.    D/w Dr. Michele Nolasco MD  PGY-2, Obstetrics and Gynecology        Principal Problem:    Pre-eclampsia, severe, postpartum condition    Pregnancy Problems (from 21 to present)       Problem Noted Resolved    Pre-eclampsia, severe, postpartum condition 2024 by Jacqueline Anderson MD No    Priority:  Medium      Labor and delivery indication for care or intervention 2024 by Meggan Morel MD No    Priority:  Medium      Anemia affecting pregnancy in third trimester 2023 by Yady Boothe MD No    Priority:  Medium      Overview Signed 2023  2:26 PM by Yady Boothe MD     Anemia hgb 10.2, ferritin 13. Rx'd PO iron         Pelvic pain in pregnancy 2023 by Emili Roche MD No    Priority:  Medium      Overview Signed 2023 12:50 PM by Meggan Morel MD     Pregnancy support belt          Dichorionic diamniotic twin pregnancy in third trimester 10/19/2023 by Fabiola Munoz MD No    Priority:  Medium      Overview Addendum 2023 12:51 PM by Meggan Morel MD      [ ] serial growths beginning 24-28 weeks  [ ] delivery 37-38 weeks         Gastroesophageal reflux during pregnancy in third trimester, antepartum 2023 by Varinder Rae No    Priority:  Medium      Overview Addendum 2024  1:10 PM by Gabino Dowd MD     Continue Tums and Pepcid PRN.         Prolapse of female pelvic organs 2023 by Varinder Rae No    Priority:  Medium      Overview Addendum 2023 12:06 PM by Emili Roche MD     Patient reports hx of intermittent pelvic organ prolapse during pregnancy, beginning in  pregnancy. Went on to have  x3.   - Occurs during Valsalva, usually with constipation. Previously instructed to manually reduce.   - Pt has regular soft BM's now, has miralax to use prn   - pelvic floor therapy; previously declined pessary placement per patient   [ x] Urogynecology apt  -Discussed following up postpartum for any corrective surgery. Has appt scheduled 2024         36 weeks gestation of pregnancy 2023 by Varinder Rae No    Priority:  Medium      Overview Addendum 2024 12:12 PM by Alysha Mi MD     [x] labs uptodate  [X] Anatomy US  [X] 2nd tri labs, hgb 10.2  [X] 1 hr gtt  -wnl  [X] tdap   [x] flu   [x] declined covid  [x] GBS: positive  [x] ppBC: depo   [x] breast feeding, to resend Rx today   [ ] Delivery planning: for pLTCS , baby A breech          Hypertension affecting pregnancy in third trimester 2023 by Varinder Rae No    Priority:  Medium      Overview Addendum 2023 12:50 PM by Meggan Morel MD     [x] Baseline HELLP Labs, P:C 0.05  [X] bASA  [ ]  Testing  [ ] Echo vs EKG    Serial Growths    Was on nifedipine 30 prior to pregnancy, no meds currently                  Subjective   Feeling well, no complaints. Denies HA, vision changes, CP, SOB, or RUQ pain.      Objective   Allergies:   Patient has no known allergies.         Last Vitals:  Temp Pulse Resp BP MAP Pulse Ox   36.5  °C (97.7 °F) 82 20 130/69   98 %     Vitals Min/Max Last 24 Hours:  Temp  Min: 36.5 °C (97.7 °F)  Max: 37.4 °C (99.3 °F)  Pulse  Min: 66  Max: 128  Resp  Min: 15  Max: 20  BP  Min: 116/57  Max: 157/89    Intake/Output:     Intake/Output Summary (Last 24 hours) at 2/9/2024 0659  Last data filed at 2/9/2024 0600  Gross per 24 hour   Intake 1391.43 ml   Output 4000 ml   Net -2608.57 ml       Physical Exam:  General: Well appearing, alert  HEENT: normocephalic, EOMI  Cardio: RRR  Resp: CTAB  Neuro: DTRs1+  Extremities: no calf tenderness  Psych: A&O x3, appropriate mood and affect      Lab Data:    Results from last 7 days   Lab Units 02/09/24  0323 02/08/24  2056   WBC AUTO x10*3/uL 5.8 5.3   HEMOGLOBIN g/dL 9.6* 8.2*   HEMATOCRIT % 31.2* 27.3*   PLATELETS AUTO x10*3/uL 441 366   AST U/L 19 22   ALT U/L 22 21   CREATININE mg/dL 0.62 0.66

## 2024-02-09 NOTE — DISCHARGE SUMMARY
Discharge Summary    Admission Date: 2024  Discharge Date: 2024    Discharge Diagnosis  Pre-eclampsia, severe, postpartum condition    Patient was admitted on 2/8 PM for severe unrelenting headache and severe range Blood pressure at home. Previously with cHTN not on meds. On arrival her headache was unresponsive to medications and Bps were high mild range. She was diagnosed with superimposed preeclampsia with severe features and started on Nifed 60 and magnesium infusion. She received 24hrs of magnesium and blood pressures remained well controlled on Nifed 60. She was discharged home on nifed 60 with BP check scheduled next week and post partum visit in 2 weeks    Hospital Course  Delivery Date:      Maxwell Roche [[id number]]   2024      Edi Roche [[id number]]   2024       Maxwell Roche [[id number]]   7:00 AM      Edi Roche [[id number]]   7:02 AM   Delivery type:      Maxwell Roche [[id number]]   , Low Transverse      Edi Roche [[id number]]   , Low Transverse    GA at delivery: 36w4d  Outcome:      Maxwell Roche [[id number]]   Living      Edi Rohce [[id number]]   Living   Anesthesia during delivery:      Maxwell Roche [[id number]]   Spinal      Edi Roche [[id number]]   Spinal   Intrapartum complications:      Maxwell Roche [[id number]]   None      Edi Roche [[id number]]   None   Feeding method: Breastfeeding Status: Yes     Pertinent Physical Exam At Time of Discharge  GENERAL: well developed, well nourished female in no acute distress  HEENT: clear sclera  NECK: supple  LUNGS: breathing comfortably on room air  HEART: warm and well-perfused  SKIN: no rashes or lesions  NEUROLOGICAL: grossly intact bilaterally  PSYCHOLOGICAL: appropriate affect      Discharge Meds     Your medication list        START taking these medications        Instructions Last Dose Given Next Dose Due   FeroSuL tablet  Generic drug: ferrous sulfate (325 mg ferrous sulfate)      Take 1  tablet by mouth once daily with breakfast.       NIFEdipine ER 60 mg 24 hr tablet  Commonly known as: Adalat CC  Start taking on: February 10, 2024      Take 1 tablet (60 mg) by mouth once daily in the morning. Take before meals. Do not crush, chew, or split. Do not start before February 10, 2024.              CONTINUE taking these medications        Instructions Last Dose Given Next Dose Due   acetaminophen 325 mg tablet  Commonly known as: Tylenol      Take 3 tablets (975 mg) by mouth every 6 hours if needed for mild pain (1 - 3).       [facility]ocate Blood Pressure Monitr kit  Generic drug: blood pressure test kit-large      1 kit 2 times a day.       ibuprofen 600 mg tablet      Take 1 tablet (600 mg) by mouth every 6 hours if needed for mild pain (1 - 3).              STOP taking these medications      ferrous gluconate 324 (38 Fe) mg tablet  Commonly known as: Fergon        Heartburn Relief (famotidine) 20 mg tablet  Generic drug: famotidine        Se--19 29 mg iron- 1 mg tablet  Generic drug: -iron fum-folic acid        Unisom (doxylamine) 25 mg tablet  Generic drug: doxylamine        Vitamin B-6 25 mg tablet  Generic drug: pyridoxine                  Where to Get Your Medications        These medications were sent to [facility] #[id number] - [place], [place] - [address]  [address], [facility] [address]      Phone: [phone]   NIFEdipine ER 60 mg 24 hr tablet       These medications were sent to [facility]  [address] [place] [id number]      Hours: 8:30 AM to 5 PM Mon-Fri Phone: [phone]   FeroSuL tablet          Complications Requiring Follow-Up  BP check and postpartum visit    Test Results Pending At Discharge  Pending Labs       No current pending labs.            Outpatient Follow-Up  Future Appointments   Date Time Provider Department Center   2024  1:00 PM [id number]YN NURSE RESOURCE [id number]   3/7/2024 11:30 AM Jesus Ross MD  [id number]   5/7/2024  9:00 AM Mark Singer MD [facility]           Elif Mcduffie MD

## 2024-02-09 NOTE — LACTATION NOTE
Lactation Consultant Note  Lactation Consultation  Reason for Consult: Follow-up assessment  Consultant Name: DAVID Miranda, IBCLC    Maternal Information       Maternal Assessment       Infant Assessment       Feeding Assessment       LATCH TOOL       Breast Pump       Other OB Lactation Tools       Patient Follow-up       Other OB Lactation Documentation       Recommendations/Summary    Attempted to see this mother of  twins who was re-admitted. She was sleeping at the time. Will try again.

## 2024-02-09 NOTE — H&P
Obstetrical Admission History and Physical  Subjective   Ivanna Addison is a 23 y.o.  sp PLTCS on 24 for PTL for di/di twins.   Prenatal Care with [facility].    Pt had CHTN but did not require meds during pregnancy. She also did not have any issues with BP around the time of delivery and was discharged home with no meds.     Patient reports today that she has had an unrelenting headache not responsive to Tylenol or Motrin, some chest pain, left foot dorsal swelling with 2+ pitting edema with TTP, and her BP at home was 171/106. Pt took a dose of PO Nifed 30mg at 7 PM prior to arrival to the hospital from a bottle she had from the first trimester when it was given to her in case she needed it. Her headache is still present on admit and BP is still high mild in 150s/80s. Chest pain is somewhat better on admit.    Pregnancy notables:  Medical Problems          Gastroesophageal reflux during pregnancy in third trimester, antepartum    Obesity, Class III, BMI 40-49.9 (morbid obesity) (CMS/HCC)    Prolapse of female pelvic organs    Hypertension affecting pregnancy in third trimester    Dichorionic diamniotic twin pregnancy in third trimester    Pelvic pain in pregnancy    Anemia affecting pregnancy in third trimester        Obstetrical History   OB History    Para Term  AB Living   4 4 3 1   5   SAB IAB Ectopic Multiple Live Births         1 5      # Outcome Date GA Lbr Isidoro/2nd Weight Sex Delivery Anes PTL Lv   4A  24 36w4d  2.67 kg F CS-LTranv Spinal  ARSEN      Complications: Breech birth, fetus 1   4B  24 36w4d  2.7 kg M CS-LTranv Spinal  ARSEN      Complications: Breech birth, fetus 1   3 Term 22 38w1d  2.948 kg F Vag-Spont EPI N ARSEN   2 Term 20 39w0d  3.26 kg M Vag-Spont EPI N ARSEN   1 Term 18 39w6d  3.714 kg F Vag-Spont EPI N ARSEN     Past Medical History  Past Medical History:   Diagnosis Date    Chronic hypertension     Eczema      Past Surgical History    Past Surgical History:   Procedure Laterality Date    CHOLECYSTECTOMY N/A 2018     Social History  Denies abuse  Denies T/E/D  Twins are home with her .    Allergies  Patient has no known allergies.    Medications  None    Objective   BMI Temp Pulse Resp BP MAP O2 Sat   Body mass index is 46.67 kg/m². 36.5 °C (97.7 °F) 85 20 (!) 157/89   100 %     Physical Examination   General:   Alert and oriented, in no acute distress   Neck: Supple. No visible thyromegaly.    Abdomen: Soft, non-tender, gravid. Pfannestiel well healed.   Extremity Left foot dorsum 2+ pitting edema with TTP   Psych Normal affect. Normal mood.       Assessment   Assessment/Plan  Ivanna Addison is a 23 y.o.  sp PLTCS on 24 readmitted for PP sPEC.    - Admit to inpatient. I anticipate that this patient will require a stay exceeding at least 1 midnight for postpartum care.  - T&S, CBC, CMP, BNP, EKG  - Add another Nifed 30mg now for a total of 60mg Nifed daily  - Start Magnesium now   - Pumping    Jacqueline Anderson MD

## 2024-02-09 NOTE — CARE PLAN
The patient's goals for the shift include      The clinical goals for the shift include Maintain BPs less than 160/110 and remian free from s/sx of severe PEC    Pt admitted overnight for PEC with severe features (HA & severe range Bps) and mag sulfate was started. Pt with several severe range Bps that were normotensive on recheck. Pt c/o intermittent dull HA. Pt resting comfortably in bed. Mother of pt at bedside supportive of patient and involved in care.

## 2024-02-09 NOTE — CARE PLAN
The patient's goals for the shift include  pt will remain safe and free of injury through end of shift 2/9/2024 0700.    The clinical goals for the shift include pt will maintain BP WNL through end of shift 2/9/2024 0700.    Over the shift, the patient did not make progress toward the following goals. Barriers to progression include n/a. Recommendations to address these barriers include n/a.

## 2024-02-16 ENCOUNTER — APPOINTMENT (OUTPATIENT)
Dept: OBSTETRICS AND GYNECOLOGY | Facility: CLINIC | Age: 24
End: 2024-02-16
Payer: COMMERCIAL

## 2024-02-17 LAB
ATRIAL RATE: 95 BPM
P AXIS: 38 DEGREES
P OFFSET: 183 MS
P ONSET: 140 MS
PR INTERVAL: 162 MS
Q ONSET: 221 MS
QRS COUNT: 15 BEATS
QRS DURATION: 66 MS
QT INTERVAL: 354 MS
QTC CALCULATION(BAZETT): 444 MS
QTC FREDERICIA: 412 MS
R AXIS: 18 DEGREES
T AXIS: 27 DEGREES
T OFFSET: 398 MS
VENTRICULAR RATE: 95 BPM

## 2024-02-19 ENCOUNTER — CLINICAL SUPPORT (OUTPATIENT)
Dept: OBSTETRICS AND GYNECOLOGY | Facility: CLINIC | Age: 24
End: 2024-02-19
Payer: COMMERCIAL

## 2024-02-19 VITALS
SYSTOLIC BLOOD PRESSURE: 135 MMHG | WEIGHT: 286.3 LBS | BODY MASS INDEX: 44.93 KG/M2 | DIASTOLIC BLOOD PRESSURE: 84 MMHG | HEIGHT: 67 IN | HEART RATE: 81 BPM

## 2024-02-19 RX ORDER — OXYCODONE HYDROCHLORIDE 5 MG/1
10 TABLET ORAL EVERY 4 HOURS PRN
COMMUNITY
Start: 2024-01-27 | End: 2024-04-08 | Stop reason: WASHOUT

## 2024-02-19 ASSESSMENT — PAIN SCALES - GENERAL: PAINLEVEL: 0-NO PAIN

## 2024-02-19 NOTE — PROGRESS NOTES
Pt here  for 2nd RN BP check s/p readmission for sPEC on 2/8/24 an received magnesium sulfate and started on nifedpine 60 mg po and took today at approx 918 am.    C/S twins on 1/26/24    She reports no headaches and feels well. Last recorded BP at home on 2/10 150/97 and rechecked was 145/86. Has BP cuff at home.    Pt has left nipple that has a small white area where she reports she had a blister that pop and now healed. She is pumping every 3 hrs. This area is non reddened and not painful, appears to be healed but suggested she follow up with lactation visit which she plans to scheduled.    She is asking about rescheduling her iron infusions- states she feels cold. - suggested she discuss at her visit next wk.  Her incision is healing well- discussed pp limitations    Pt 's bp discussed with Dr Ponce - pt to stay on the nifedipine as ordered. May schedule pp visit next wk in resident clinic.   Patient agrees with this plan for follow up

## 2024-02-23 ENCOUNTER — LACTATION CONSULT (OUTPATIENT)
Dept: LACTATION | Facility: CLINIC | Age: 24
End: 2024-02-23
Payer: COMMERCIAL

## 2024-02-23 DIAGNOSIS — O92.70 LACTATION DISORDER (HHS-HCC): Primary | ICD-10-CM

## 2024-02-23 PROCEDURE — 99211 OFF/OP EST MAY X REQ PHY/QHP: CPT | Performed by: STUDENT IN AN ORGANIZED HEALTH CARE EDUCATION/TRAINING PROGRAM

## 2024-02-23 ASSESSMENT — ENCOUNTER SYMPTOMS
LOSS OF SENSATION IN FEET: 0
OCCASIONAL FEELINGS OF UNSTEADINESS: 0
DEPRESSION: 0

## 2024-02-23 NOTE — PROGRESS NOTES
Lactation Counseling Note    Subjective:    Ivanna Addison is a 23 y.o. patient referred for lactation counseling. She is here today due to Sore/cracked nipple(s), Breast pumping concerns/issues, and Latch issues. She was referred by her  self .     OB HISTORY:   Healthcare Provider: [facility]  Prenatal Screening: twins  Maternal Blood Type: A positive  /Para: : 4  Para: 5  Weeks Gestation: 36 5/7  Mode of Delivery: Primary  section  Epidural/General Anesthesia  spinal  Delivery Complications: Malposition/Malpresentation  Multiple gestation  Maternal Complications: Preeclampsia  Readmitted after went home  Blair Information: Baby's Name: Maxwell Roche  : 24  MRN: [id number]  Place of Birth: [place]  Healthcare Provider: [facility]  APGARS: 1 minute: 8  5 minutes: 8  Skin to skin contact: Delayed in recovery  Infant's blood type: not tested  Birth parameters: AGA  Birth weight: 2670 gm  Birth length: 47.5 cm  Discharge weight: 2537 gm  Complications: none latch issues donor milk  No other concerns       Information: Baby's Name: Edi Roche  : 24  MRN: [id number]  Place of Birth: [place]  Healthcare Provider: Diomed[facility]  APGARS: 1 minute: 8  5 minutes: 9  Skin to skin contact: Delayed recovery  Infant's blood type: not tested  Birth parameters: AGA  Birth weight: 2700 gm  Birth length: 50 cm  Discharge weight: 2564 gm  Complications: latching supplemented donor milk  No other concerns  Objective:    BREASTFEEDING ASSESSMENT:     Baby Name: Maxwell Roche  Breast Assessment: Large, Pendulous, Symmetrical, Soft, and Compressible  Nipple Assessment/Stage: piercing present, erect, creased after feeding, distorted shape or flattened, and sore right nipple white bleb  Areola: Normal  Feeding Position: breast sandwich, football/seated, skin to skin, one sided nursing, nipple to nose, mother needs assistance with latch/positioning, and nose or chin not touching breast  Latch: moderate  assistance is needed, instructed on deep latch, shallow latch, latch achieved after repeated attempts, mouth not open wide enough, latch is painful, pain during feeding, bursts of sucking, swallowing, and rest, upper lip turned in, lower lip turned in, chin moves in rhythmic motion, clenched jaws, and minimal audible swallowing  Suck/Feeding: sustained, coordinated suck/swallow/breathe, clenching/biting, baby led rhythmically, audible swallowing with stimulation, cheeks dimple during sucking, and supplemented breast  Alternative Feeding Methods: paced bottle  Feeding and Cleaning instructions reviewed for: Paced bottle  Infant Feeding Method: Breast milk and Formula  Infant Behavior: awake, readiness to feed, feeding cues observed, rooting response, and suckles on and off, needs stimulation  Infant Information: Palate- high/arch/bubble/normal  Poor occlusion of lips around areola  Tongue humped/bunched/retracted/elevated  Good cupping of tongue  Fontanel: flat  Mucous Membranes: moist  No other concerns  Breast Pain: Pain scale 0-10 (10 most pain): 0  Nipple Pain: Pain scale 0-10 (10 most pain): 10  Pain description: bite  Before feeding pain 0-10 (10 most pain): 8  After adjustment pain 0-10 (10 most pain): 1  Other pain relief methods: lanolin, soaking for the white bleb  No other concerns  Weight: Reported pediatrician visit weight: 3090 gm  Date of pediatrician weight: 24  Weight before feedin gm  Weight after feedin gm  Amount of breast milk transferred: 11 ml  Number of voids in the last 24 hours: 6-8  Number of stools in the last 24 hours: 6-8  Took 3 oz expressed milk via bottle    Baby Name: Edi Roche  Breast Assessment: Large, Pendulous, Symmetrical, Soft, and Compressible  Nipple Assessment/Stage: piercing present, erect, creased after feeding, and distorted shape or flattened left nipple nontender  Areola: Normal  Feeding Position: breast sandwich, football/seated, skin to skin,  one side per feeding, mother needs assistance with latch/positioning, and nose or chin not touching breast  Latch: moderate assistance is needed, instructed on deep latch, cries while latching, shallow latch, mouth not open wide enough, upper lip turned in, lower lip turned in, and clenched jaws  Suck/Feeding: unsustained, clenching/biting, non-nutritional sucking, cheeks dimple during sucking, and supplemented breast  Alternative Feeding Methods: nursing at the breast, feeding expressed breast milk, and paced bottle  Feeding and Cleaning instructions reviewed for: Paced bottle  Infant Feeding Method: Breast milk and Formula  Infant Behavior: awake, fussy, readiness to feed, feeding cues observed, and rooting response  Infant Information: Palate- high/arch/bubble/normal  Poor occlusion of lips around areola  Tongue protrudes over alveolar ridge  Tongue humped/bunched/retracted/elevated  Good cupping of tongue  Fontanel: flat  Mucous Membranes: moist  No other concerns  Breast Pain: Pain scale 0-10 (10 most pain): 0  Nipple Pain: Pain scale 0-10 (10 most pain): ) left breast  Weight: Reported pediatrician visit weight:  3113 gm  Date of pediatrician weight: 24  Weight before feedin gm  Weight after feedin gm  Amount of breast milk transferred: 0 ml  Number of voids in the last 24 hours: 6-8  Number of stools in the last 24 hours: 6-8  Took 3 oz expressed milk  via bottle after attempting to latch        PATIENT DISCUSSION SUMMARY:  .  .  Mother and infants seen for concerns over latching.  Mother has been primarily feeding expressed milk and formula due to  both infants' difficulty latching. Both babies are taking 4-6 oz of formula daily in addition to mother's expressed milk.    Mother has been feeding on schedule.   Mother's milk in and milk easily expressible.      Infants' weight gain  good     .  Infants' alert with moist mucous membranes. Both had wet diapers in office.    Mother's nipples  erect and breasts soft.  Maxwell able to sustain latch. Mother reports latch as painful due to white bleb.  Mother feels biting, and nipple creased after feeding, even with deepest latch achieved.     Suck assessment Maxwell:  abnormal: reveals infant with tight maxillary frenulum, and curls upper lip, but lip able to be everted.  Infant with good cupping and extension of tongue, with posterior humping of tongue, and moderate lateralization at this exam.  Infant not noted to elevate tongue past mid mouth on this exam. Infant with periodic retraction and biting. Palate high and intact to palpation.    Mother's nipples erect and breasts soft.  Edi unable to sustain latch. Mother reports latch as comfortable but does feel biting at times.  Nipple rounded, but infant unable to sustain longer than 3-5 sucks.      Suck assessment Edi abnormal: reveals infant with tight maxillary frenulum, and curls upper lip, but lip able to be everted.  Infant with good cupping and extension of tongue, with posterior humping of tongue, and moderate lateralization at this exam.  Infant not noted to elevate tongue past mid mouth on this exam. Infant with frequent retraction and biting. Palate high and intact to palpation.    Discussed to bottle feed with more upright positioning and martin lips to help infant have wide latch when using bottle by pulling down on chin and everting upper lip. Maxwell with click and audible loss of suction on bottle.    Mother instructed on suck training exercises.  Pumping session observed with  Symphony pump   with   24   mm flanges.  Discussed trying 27  mm for improved fit. Discussed increasing vacuum to comfort, use of massage and compression, and hand expression after pumping to improve breast emptying.  Mother shown technique for hand expression. Pumping out approximately   6 oz  in 20   minutes.    Reviewed post birth warning signs and safe sleep    Plan:  Mother to do suck training exercises prior to  every feed.  Attempt to latch at least 8 times daily, both breasts for as long as infants are actively sucking and swallowing.  Limit to 10 minutes if not hearing active swallows. Use massage and compression to aid transfer.      At least 8 times daily, mother to pump both breasts at same time 20 minutes on MAINTAIN cycle, using massage and compression, with hand expression after to fully drain breast. Feed expressed milk to infant until satisfied.    Due to inability to transfer infant needs to be evaluated by ENT or pediatric dentist for tight posterior frenulum, and to evaluate maxillary frenulum.    Will f/u with lactation as needed and with pediatrician.    Denies questions.  LACTATION PROBLEMS AND STRATEGIES:  Problems latching baby to breast: Baby should latch to areola (dark area) not just the nipple.  Baby's lips should be flanged outward.  Bring the baby up to the level of your breast by putting a pillow under the baby.  Dribble milk over the nipple or express milk so that baby can taste it  Encourage a deeper latch with the asymetrical latch- lining baby's nose opposite mother's nipple.  Encourage nipple to stand up prior to feeing by pumping, nipple rolling or by brief application of ice.  Gently tickle your baby's lip with your nipple to encourage your baby to open his/her mouth wide.  Hold baby so that your breast is positioned deep in the baby's mouth.  Hold your baby close, tummy to tummy.  If baby does not latch to breast, express breast milk.  If the baby is not latched on well, break seal and gently try again.  Keep baby skin to skin and watch for feeding cues.  Massage breast to start milk-ejection reflex.  Place nipple and at least 1 inch of areola into baby's mouth.  Place your hand behind the baby's neck and shoulder.  Do not force baby's head into breast.  Put baby in the football hold or clutch hold, supporting his/her neck and head in sniffing position to open his/her throat.  Shape breast  "into oval shape (sandwich hold)  for deep latch.  Try different feeding positions (cradle, football, side etc.).  Use a breast feeding pillow to bring baby up to the level of the breast.  Use semi-reclined feeding position to allow baby to take an active role and trigger baby's inborn feeding behavior.  Use your hand to support your breast during feeding.  When your baby's mouth is wide open, quickly pull baby into your breast.  Your baby's chin should be pressed into your breast.  Pumping pointers: Air dry nipples, express milk and rub in or use an approved nipple cream after expressing., Apply heat to breasts before pumping., Choose a familiar comfortable place to express milk., If nipples are sore use less pressure and pump more frequently for shorter times., Listen to a tape of baby while pumping., Look at a photo of baby wile expressing., Massage breasts before and during pumping., Minimize distractions., Moisten flange before beginning to improve seal., Relax for 5 minutes before pumping., Stimulate the nipples and hand express a few drops before pumping., and Use pumping bra/band for \"hands free\" pumping.  PATIENT INSTRUCTION HANDOUTS GIVEN:   Tips for pumping with an electric breast pump and milk storage for your healthy baby (PI# 123)  Suck training (PI# 176)  How to make formula safely  How to keep your breast pump kit clean  Foods that promote good milk production  Breastfeeding: Tips to increase your milk supply (PI# 162)  Breastfeeding: Sore and cracked nipples (PI# 167)  Breastfeeding: Plugged milk ducts/mastitis (PI# 164)  Breast massage with milk expression by hand (PI# [id number])  LACTATION EDUCATION:  Correct Positioning, Correct Latch On, and Demo use of Pump  "

## 2024-02-23 NOTE — PATIENT INSTRUCTIONS
.  .  Mother and infants seen for concerns over latching.  Mother has been primarily feeding expressed milk and formula due to  both infants' difficulty latching. Both babies are taking 4-6 oz of formula daily in addition to mother's expressed milk.    Mother has been feeding on schedule.   Mother's milk in and milk easily expressible.      Infants' weight gain  good     .  Infants' alert with moist mucous membranes. Both had wet diapers in office.    Mother's nipples erect and breasts soft.  Maxwell able to sustain latch. Mother reports latch as painful due to white bleb.  Mother feels biting, and nipple creased after feeding, even with deepest latch achieved.     Suck assessment Maxwell:  abnormal: reveals infant with tight maxillary frenulum, and curls upper lip, but lip able to be everted.  Infant with good cupping and extension of tongue, with posterior humping of tongue, and moderate lateralization at this exam.  Infant not noted to elevate tongue past mid mouth on this exam. Infant with periodic retraction and biting. Palate high and intact to palpation.    Mother's nipples erect and breasts soft.  Edi unable to sustain latch. Mother reports latch as comfortable but does feel biting at times.  Nipple rounded, but infant unable to sustain longer than 3-5 sucks.      Suck assessment Edi abnormal: reveals infant with tight maxillary frenulum, and curls upper lip, but lip able to be everted.  Infant with good cupping and extension of tongue, with posterior humping of tongue, and moderate lateralization at this exam.  Infant not noted to elevate tongue past mid mouth on this exam. Infant with frequent retraction and biting. Palate high and intact to palpation.    Discussed to bottle feed with more upright positioning and martin lips to help infant have wide latch when using bottle by pulling down on chin and everting upper lip. Maxwell with click and audible loss of suction on bottle.    Mother instructed on suck  training exercises.  Pumping session observed with  Symphony pump   with   24   mm flanges.  Discussed trying 27  mm for improved fit. Discussed increasing vacuum to comfort, use of massage and compression, and hand expression after pumping to improve breast emptying.  Mother shown technique for hand expression. Pumping out approximately   6 oz in 20   minutes.    Reviewed post birth warning signs and safe sleep    Plan:  Mother to do suck training exercises prior to every feed.  Attempt to latch at least 8 times daily, both breasts for as long as infants are actively sucking and swallowing.  Limit to 10 minutes if not hearing active swallows. Use massage and compression to aid transfer.      At least 8 times daily, mother to pump both breasts at same time 20 minutes on MAINTAIN cycle, using massage and compression, with hand expression after to fully drain breast. Feed expressed milk to infant until satisfied.    Due to inability to transfer infant needs to be evaluated by ENT or pediatric dentist for tight posterior frenulum, and to evaluate maxillary frenulum.    Will f/u with lactation as needed and with pediatrician.    Denies questions.  LACTATION PROBLEMS AND STRATEGIES:  Problems latching baby to breast: Baby should latch to areola (dark area) not just the nipple.  Baby's lips should be flanged outward.  Bring the baby up to the level of your breast by putting a pillow under the baby.  Dribble milk over the nipple or express milk so that baby can taste it  Encourage a deeper latch with the asymetrical latch- lining baby's nose opposite mother's nipple.  Encourage nipple to stand up prior to feeing by pumping, nipple rolling or by brief application of ice.  Gently tickle your baby's lip with your nipple to encourage your baby to open his/her mouth wide.  Hold baby so that your breast is positioned deep in the baby's mouth.  Hold your baby close, tummy to tummy.  If baby does not latch to breast, express  "breast milk.  If the baby is not latched on well, break seal and gently try again.  Keep baby skin to skin and watch for feeding cues.  Massage breast to start milk-ejection reflex.  Place nipple and at least 1 inch of areola into baby's mouth.  Place your hand behind the baby's neck and shoulder.  Do not force baby's head into breast.  Put baby in the football hold or clutch hold, supporting his/her neck and head in sniffing position to open his/her throat.  Shape breast into oval shape (sandwich hold)  for deep latch.  Try different feeding positions (cradle, football, side etc.).  Use a breast feeding pillow to bring baby up to the level of the breast.  Use semi-reclined feeding position to allow baby to take an active role and trigger baby's inborn feeding behavior.  Use your hand to support your breast during feeding.  When your baby's mouth is wide open, quickly pull baby into your breast.  Your baby's chin should be pressed into your breast.  Pumping pointers: Air dry nipples, express milk and rub in or use an approved nipple cream after expressing., Apply heat to breasts before pumping., Choose a familiar comfortable place to express milk., If nipples are sore use less pressure and pump more frequently for shorter times., Listen to a tape of baby while pumping., Look at a photo of baby wile expressing., Massage breasts before and during pumping., Minimize distractions., Moisten flange before beginning to improve seal., Relax for 5 minutes before pumping., Stimulate the nipples and hand express a few drops before pumping., and Use pumping bra/band for \"hands free\" pumping.  PATIENT INSTRUCTION HANDOUTS GIVEN:   Tips for pumping with an electric breast pump and milk storage for your healthy baby (PI# 123)  Suck training (PI# 176)  How to make formula safely  How to keep your breast pump kit clean  Foods that promote good milk production  Breastfeeding: Tips to increase your milk supply (PI# " [id number])  Breastfeeding: Sore and cracked nipples (PI# 167)  Breastfeeding: Plugged milk ducts/mastitis (PI# 164)  Breast massage with milk expression by hand (PI# [id number])  LACTATION EDUCATION:  Correct Positioning, Correct Latch On, and Demo use of Pump

## 2024-02-26 ENCOUNTER — TELEPHONE (OUTPATIENT)
Dept: LACTATION | Facility: CLINIC | Age: 24
End: 2024-02-26
Payer: COMMERCIAL

## 2024-02-26 NOTE — LACTATION NOTE
Follow up phone call for lactation visit. Left message for patient to call IBCLC at [phone] to discuss progress or concerns.

## 2024-02-27 ENCOUNTER — PHARMACY VISIT (OUTPATIENT)
Dept: PHARMACY | Facility: CLINIC | Age: 24
End: 2024-02-27
Payer: MEDICAID

## 2024-02-27 ENCOUNTER — POSTPARTUM VISIT (OUTPATIENT)
Dept: OBSTETRICS AND GYNECOLOGY | Facility: CLINIC | Age: 24
End: 2024-02-27
Payer: COMMERCIAL

## 2024-02-27 PROCEDURE — RXMED WILLOW AMBULATORY MEDICATION CHARGE

## 2024-02-27 PROCEDURE — 99215 OFFICE O/P EST HI 40 MIN: CPT | Mod: GC | Performed by: STUDENT IN AN ORGANIZED HEALTH CARE EDUCATION/TRAINING PROGRAM

## 2024-02-27 RX ORDER — NIFEDIPINE 60 MG/1
60 TABLET, FILM COATED, EXTENDED RELEASE ORAL
Qty: 60 TABLET | Refills: 1 | Status: SHIPPED | OUTPATIENT
Start: 2024-02-27 | End: 2024-03-12 | Stop reason: SDUPTHER

## 2024-02-27 NOTE — PROGRESS NOTES
23y.o.  presenting for postpartum follow-up.     Delivery Date: 24  GA at Delivery: 36.4wga  Type of Delivery:     Pregnancy notable for:   - Di/di twin gestation  - Postpartum siPEC w/SF   - Anemia  - Asthma    Concerns: Breastfeeding, siPEC w/SF  Pain: controlled  Lacerations: none   Lochia: none  Menses: No  Sexual Intimacy: No  Contraceptive Method: None  Feeding Method: breast and bottle   Lactation Consult Needed?: No  Birth Trauma: Yes, describe: endorsing trauma due to recurrence of severe preeclampsia and long-term implications on her health   Bonding well with her twins  Mood:  Good    Physical exam:  GENERAL: well developed, well nourished female in no acute distress  HEENT: clear sclera  NECK: supple  LUNGS: breathing comfortably on room air  HEART: warm and well-perfused  SKIN: no rashes or lesions  NEUROLOGICAL: grossly intact bilaterally  PSYCHOLOGICAL: appropriate affect      BREAST: right breast with area of ecchymosis over nipple   ABDOMEN: incision clean, dry and intact, healing well       IMPRESSIONS:  Thank you for coming to your postpartum visit. Everything seems to be recovering appropriately at this time. You are free to resume normal activity. Please don't hesitate to call us for breast complaints, abnormal bleeding, mood changes, or other concerning symptoms - we have many good treatment options for these issues.     Placentas reviewed:  PLACENTA A  -- RELATIVELY SMALL PLACENTA (304 GRAMS; LESS THAN 10TH PERCENTILE FOR 36 WEEKS).   -- FINDINGS CONSISTENT WITH FETAL VASCULAR MALPERFUSION: INTRAMURAL FIBRIN DEPOSITION IN STEM VESSEL AND SCATTERED SMALL FOCI OF AVASCULAR VILLI.   -- LYMPHOPLASMACYTIC DECIDUITIS.  -- DISRUPTED AND POSSIBLY INCOMPLETE MATERNAL SURFACE.        PLACENTA B  -- 351 GRAMS.  -- INTERVILLOUS THROMBUS.  -- LYMPHOPLASMACYTIC DECIDUITIS.     Preventative care  - Due for next Pap in      We look forward to seeing you again at your next scheduled  visit in 2 weeks to discuss your final blood pressure medication regimen    Seen and discussed with Dr. Danielle Mcduffie MD

## 2024-03-07 ENCOUNTER — APPOINTMENT (OUTPATIENT)
Dept: MATERNAL FETAL MEDICINE | Facility: CLINIC | Age: 24
End: 2024-03-07
Payer: COMMERCIAL

## 2024-03-12 ENCOUNTER — CLINICAL SUPPORT (OUTPATIENT)
Dept: OBSTETRICS AND GYNECOLOGY | Facility: CLINIC | Age: 24
End: 2024-03-12
Payer: COMMERCIAL

## 2024-03-12 VITALS
BODY MASS INDEX: 46.05 KG/M2 | WEIGHT: 293 LBS | DIASTOLIC BLOOD PRESSURE: 83 MMHG | HEART RATE: 92 BPM | SYSTOLIC BLOOD PRESSURE: 134 MMHG

## 2024-03-12 DIAGNOSIS — I10 CHRONIC HYPERTENSION: Primary | ICD-10-CM

## 2024-03-12 RX ORDER — NIFEDIPINE 60 MG/1
60 TABLET, FILM COATED, EXTENDED RELEASE ORAL
Qty: 60 TABLET | Refills: 3 | Status: SHIPPED | OUTPATIENT
Start: 2024-03-12 | End: 2024-04-08 | Stop reason: SDUPTHER

## 2024-03-12 NOTE — PROGRESS NOTES
Pt here post 6 wks for bp check appt.    C/S twins 1/26/24  Seen pp visit 2/27/24 missed scheduled provider appt 3/7/ to check bp and management   H/ si PEC w s/f  Taking nifedipine 60 mg po daily  Pt reports 3/1 bp 155/95- had headache                    3/2/ 148/95 headache on 3/4 136/86 with no headaches reported after this date and pt does not have any more bp readings but reports they have not been high.    Pt inc is healed but reports on r side she feels it is open. It it closed and intact but has a very small circular area  approx 1 inch below the inc line that is circular surrounding a pubic hair follicle  that is reddened size is  approx  1 mm in diameter.  Non draining  and pt took a picture on her cell phone for reference.    This pt was discussed with Dr Farmer and Dr Shoemaker. A referral was placed for internal medicine for blood pressure follow up with refills sent for nifedipine. PT is aware of this for continued management. To continue to check bp and area below inc area. If the area that is reddened to call for gyn follow up.

## 2024-03-18 ENCOUNTER — TELEPHONE (OUTPATIENT)
Dept: OBSTETRICS AND GYNECOLOGY | Facility: HOSPITAL | Age: 24
End: 2024-03-18
Payer: COMMERCIAL

## 2024-03-19 NOTE — TELEPHONE ENCOUNTER
Called because she had a headache. Took her bp and was 184/112, 181/111, 159/92.  Took 3 tylenol and the headache is only mild now.    Counseled to go to the ED if she has unrelenting headache and bps greater than 160/100.   Nobody has called her to schedule a medicine appointment, but she plans to call herself tomorrow.

## 2024-04-08 ENCOUNTER — LAB (OUTPATIENT)
Dept: LAB | Facility: LAB | Age: 24
End: 2024-04-08
Payer: COMMERCIAL

## 2024-04-08 ENCOUNTER — OFFICE VISIT (OUTPATIENT)
Dept: PRIMARY CARE | Facility: CLINIC | Age: 24
End: 2024-04-08
Payer: COMMERCIAL

## 2024-04-08 VITALS
HEIGHT: 67 IN | DIASTOLIC BLOOD PRESSURE: 100 MMHG | SYSTOLIC BLOOD PRESSURE: 140 MMHG | BODY MASS INDEX: 45.99 KG/M2 | WEIGHT: 293 LBS

## 2024-04-08 DIAGNOSIS — I10 CHRONIC HYPERTENSION: ICD-10-CM

## 2024-04-08 LAB
BASOPHILS # BLD AUTO: 0.03 X10*3/UL (ref 0–0.1)
BASOPHILS NFR BLD AUTO: 0.5 %
CHOLEST SERPL-MCNC: 155 MG/DL (ref 0–199)
CHOLESTEROL/HDL RATIO: 2.3
EOSINOPHIL # BLD AUTO: 0.38 X10*3/UL (ref 0–0.7)
EOSINOPHIL NFR BLD AUTO: 6.3 %
ERYTHROCYTE [DISTWIDTH] IN BLOOD BY AUTOMATED COUNT: 16.3 % (ref 11.5–14.5)
HCT VFR BLD AUTO: 37.6 % (ref 36–46)
HDLC SERPL-MCNC: 68.3 MG/DL
HGB BLD-MCNC: 11.2 G/DL (ref 12–16)
IMM GRANULOCYTES # BLD AUTO: 0.01 X10*3/UL (ref 0–0.7)
IMM GRANULOCYTES NFR BLD AUTO: 0.2 % (ref 0–0.9)
LDLC SERPL CALC-MCNC: 79 MG/DL
LYMPHOCYTES # BLD AUTO: 2.24 X10*3/UL (ref 1.2–4.8)
LYMPHOCYTES NFR BLD AUTO: 37.1 %
MCH RBC QN AUTO: 24.6 PG (ref 26–34)
MCHC RBC AUTO-ENTMCNC: 29.8 G/DL (ref 32–36)
MCV RBC AUTO: 83 FL (ref 80–100)
MONOCYTES # BLD AUTO: 0.31 X10*3/UL (ref 0.1–1)
MONOCYTES NFR BLD AUTO: 5.1 %
NEUTROPHILS # BLD AUTO: 3.06 X10*3/UL (ref 1.2–7.7)
NEUTROPHILS NFR BLD AUTO: 50.8 %
NON HDL CHOLESTEROL: 87 MG/DL (ref 0–149)
NRBC BLD-RTO: 0 /100 WBCS (ref 0–0)
PLATELET # BLD AUTO: 340 X10*3/UL (ref 150–450)
RBC # BLD AUTO: 4.56 X10*6/UL (ref 4–5.2)
TRIGL SERPL-MCNC: 40 MG/DL (ref 0–149)
VLDL: 8 MG/DL (ref 0–40)
WBC # BLD AUTO: 6 X10*3/UL (ref 4.4–11.3)

## 2024-04-08 PROCEDURE — 1036F TOBACCO NON-USER: CPT

## 2024-04-08 PROCEDURE — 36415 COLL VENOUS BLD VENIPUNCTURE: CPT

## 2024-04-08 PROCEDURE — 99204 OFFICE O/P NEW MOD 45 MIN: CPT

## 2024-04-08 PROCEDURE — 80061 LIPID PANEL: CPT

## 2024-04-08 PROCEDURE — 3077F SYST BP >= 140 MM HG: CPT

## 2024-04-08 PROCEDURE — 3080F DIAST BP >= 90 MM HG: CPT

## 2024-04-08 PROCEDURE — RXMED WILLOW AMBULATORY MEDICATION CHARGE

## 2024-04-08 PROCEDURE — 85025 COMPLETE CBC W/AUTO DIFF WBC: CPT

## 2024-04-08 RX ORDER — FERROUS SULFATE 325(65) MG
325 TABLET ORAL
Qty: 90 TABLET | Refills: 1 | Status: SHIPPED | OUTPATIENT
Start: 2024-04-08 | End: 2024-04-08 | Stop reason: SDUPTHER

## 2024-04-08 RX ORDER — FERROUS SULFATE 325(65) MG
325 TABLET ORAL
Qty: 90 TABLET | Refills: 1 | Status: SHIPPED | OUTPATIENT
Start: 2024-04-08 | End: 2024-10-05

## 2024-04-08 RX ORDER — NIFEDIPINE 30 MG/1
30 TABLET, FILM COATED, EXTENDED RELEASE ORAL NIGHTLY
Qty: 90 TABLET | Refills: 0 | Status: SHIPPED | OUTPATIENT
Start: 2024-04-08 | End: 2024-07-10

## 2024-04-08 RX ORDER — NIFEDIPINE 60 MG/1
60 TABLET, FILM COATED, EXTENDED RELEASE ORAL
Qty: 90 TABLET | Refills: 0 | Status: SHIPPED | OUTPATIENT
Start: 2024-04-08 | End: 2024-07-10

## 2024-04-08 ASSESSMENT — PATIENT HEALTH QUESTIONNAIRE - PHQ9
SUM OF ALL RESPONSES TO PHQ9 QUESTIONS 1 AND 2: 0
1. LITTLE INTEREST OR PLEASURE IN DOING THINGS: NOT AT ALL
2. FEELING DOWN, DEPRESSED OR HOPELESS: NOT AT ALL

## 2024-04-08 NOTE — PROGRESS NOTES
"Subjective   Patient ID: Ivanna Addison is a 23 y.o. female who presents for Clinic new.  HPI  23 year old female with PMH of pre-eclampsia presents today to establish care.   Developed pre-eclampsia at end of pregnancy. Has boy/girl twins that are 2 months old.  Breastfeeding.     PMH: none  PSH: gallbladder removal (2018)   PFH: updated    All systems have been reviewed and are negative for complaint other than those mentioned in the HPI.     Objective   BP (!) 140/100 (BP Location: Left arm, Patient Position: Sitting, BP Cuff Size: Large adult)   Ht 1.702 m (5' 7\")   Wt 135 kg (297 lb)   LMP 05/08/2023   Breastfeeding Yes   BMI 46.52 kg/m²    Physical Exam  Constitutional:       General: She is awake.      Appearance: Normal appearance.   HENT:      Head: Normocephalic and atraumatic.   Eyes:      Extraocular Movements: Extraocular movements intact.      Pupils: Pupils are equal, round, and reactive to light.   Cardiovascular:      Rate and Rhythm: Normal rate and regular rhythm.      Heart sounds: S1 normal and S2 normal. No murmur heard.  Pulmonary:      Effort: Pulmonary effort is normal.      Breath sounds: Normal breath sounds.   Musculoskeletal:      Cervical back: Normal range of motion and neck supple.      Right lower leg: No edema.      Left lower leg: No edema.   Skin:     General: Skin is warm and dry.   Neurological:      General: No focal deficit present.      Mental Status: She is alert and oriented to person, place, and time.   Psychiatric:         Mood and Affect: Mood and affect normal.         Behavior: Behavior normal. Behavior is cooperative.         Thought Content: Thought content normal.         Judgment: Judgment normal.     Ivanna was seen today for clinic new.  Diagnoses and all orders for this visit:  Pre-eclampsia, severe, postpartum condition  - Taking 60mg nifedipine daily. Takes in AM. By PM BP is 150s/90s.   - ADD 30mg dose in evening   -     NIFEdipine ER (Adalat CC) 60 mg " 24 hr tablet; Take 1 tablet (60 mg) by mouth once daily in the morning. Take before meals. Do not crush, chew, or split.  -     NIFEdipine ER (Adalat CC) 30 mg 24 hr tablet; Take 1 tablet (30 mg) by mouth once daily at bedtime. Do not crush, chew, or split.  Chronic hypertension  -     Referral to Primary Care  -     CBC and Auto Differential; Future  -     Lipid Panel; Future  Delivery by  section at 37-39 weeks of gestation due to labor  -     ferrous sulfate, 325 mg ferrous sulfate, tablet; Take 1 tablet by mouth once daily with breakfast.    Follow up in 1 month.

## 2024-04-11 ENCOUNTER — PHARMACY VISIT (OUTPATIENT)
Dept: PHARMACY | Facility: CLINIC | Age: 24
End: 2024-04-11
Payer: MEDICAID

## 2024-05-07 ENCOUNTER — OFFICE VISIT (OUTPATIENT)
Dept: UROLOGY | Facility: CLINIC | Age: 24
End: 2024-05-07
Payer: COMMERCIAL

## 2024-05-07 DIAGNOSIS — M62.89 PELVIC FLOOR DYSFUNCTION: Primary | ICD-10-CM

## 2024-05-07 DIAGNOSIS — N81.9 FEMALE GENITAL PROLAPSE, UNSPECIFIED TYPE: ICD-10-CM

## 2024-05-07 DIAGNOSIS — R10.2 PELVIC PAIN: ICD-10-CM

## 2024-05-07 PROCEDURE — 99214 OFFICE O/P EST MOD 30 MIN: CPT | Performed by: STUDENT IN AN ORGANIZED HEALTH CARE EDUCATION/TRAINING PROGRAM

## 2024-05-07 NOTE — PROGRESS NOTES
"HISTORY OF PRESENT ILLNESS:  Ivanna Addison is a 23 y.o. female who presents today for a follow up visit. She recently gave birth to twins. She states the prolapse is not as bothersome, but is still noticeable. She can feel it coming down, and she has to push to have a bowel movement.            Past Medical History  She has a past medical history of Chronic hypertension and Eczema.    Surgical History  She has a past surgical history that includes Cholecystectomy (N/A, 2018).     Social History  She reports that she has never smoked. She has never used smokeless tobacco. She reports that she does not drink alcohol and does not use drugs.    Family History  Family History   Problem Relation Name Age of Onset    Hypertension Father      Diabetes Maternal Grandmother      Hypertension Maternal Grandmother      Lung cancer Maternal Grandfather          Allergies  Patient has no known allergies.      A comprehensive 10+ review of systems was negative except for: see hpi                          PHYSICAL EXAMINATION:  BP Readings from Last 3 Encounters:   04/08/24 (!) 140/100   03/12/24 134/83   02/19/24 135/84      Wt Readings from Last 3 Encounters:   04/08/24 135 kg (297 lb)   03/12/24 133 kg (294 lb)   02/19/24 130 kg (286 lb 4.8 oz)      BMI: Estimated body mass index is 46.52 kg/m² as calculated from the following:    Height as of 4/8/24: 1.702 m (5' 7\").    Weight as of 4/8/24: 135 kg (297 lb).  BSA: Estimated body surface area is 2.53 meters squared as calculated from the following:    Height as of 4/8/24: 1.702 m (5' 7\").    Weight as of 4/8/24: 135 kg (297 lb).  HEENT: Normocephalic, atraumatic, PER EOMI, nonicteric, trachea normal, thyroid normal, oropharynx normal.  CARDIAC: regular rate & rhythm, S1 & S2 normal.  No heaves, thrills, gallops or murmurs.  LUNGS: Clear to auscultation, no spinal or CV tenderness.  EXTREMITIES: No evidence of cyanosis, clubbing or edema.      Aa: -1 Ba: -1 C: -6       "         Assessment:  24 yo with s/p CS for twins and POP      POP:    I discussed treatment options including pessary and surgery, with regard to surgery discussed hysterectomy vs. Hysteropexy: major benefit of hysteropexy is shorter OR time and less EBL and outcomes equivalent to hysterectomy + repair at 3 years, but no data beyond that time point. Also discussed SCP vs native tissue repair; for SCP the failure rate is 5-10%, but associated with mesh complications including erosion <1% and SBO <0.5% vs native tissue repair which is associated with 20-30% failure rate, but no long term risk of complications and only~15% requiring additional treatment.    Refer to PT         Follow up in 4 month       All questions and concerns were answered and addressed.  The patient expressed understanding and agrees with the plan.     Mark Singer MD    Scribe Attestation  By signing my name below, IBrynn, Scribmelvin   attest that this documentation has been prepared under the direction and in the presence of Mark Singer MD.

## 2024-05-08 ENCOUNTER — APPOINTMENT (OUTPATIENT)
Dept: PRIMARY CARE | Facility: CLINIC | Age: 24
End: 2024-05-08
Payer: COMMERCIAL

## 2024-05-13 ENCOUNTER — APPOINTMENT (OUTPATIENT)
Dept: PRIMARY CARE | Facility: CLINIC | Age: 24
End: 2024-05-13
Payer: COMMERCIAL

## 2024-05-22 ENCOUNTER — OFFICE VISIT (OUTPATIENT)
Dept: PRIMARY CARE | Facility: CLINIC | Age: 24
End: 2024-05-22
Payer: COMMERCIAL

## 2024-05-22 VITALS
SYSTOLIC BLOOD PRESSURE: 119 MMHG | DIASTOLIC BLOOD PRESSURE: 86 MMHG | HEIGHT: 67 IN | BODY MASS INDEX: 45.99 KG/M2 | WEIGHT: 293 LBS

## 2024-05-22 DIAGNOSIS — I10 PRIMARY HYPERTENSION: ICD-10-CM

## 2024-05-22 DIAGNOSIS — J30.1 SEASONAL ALLERGIC RHINITIS DUE TO POLLEN: Primary | ICD-10-CM

## 2024-05-22 PROCEDURE — 3079F DIAST BP 80-89 MM HG: CPT

## 2024-05-22 PROCEDURE — 99214 OFFICE O/P EST MOD 30 MIN: CPT

## 2024-05-22 PROCEDURE — 3074F SYST BP LT 130 MM HG: CPT

## 2024-05-22 RX ORDER — FLUTICASONE PROPIONATE 50 MCG
1 SPRAY, SUSPENSION (ML) NASAL DAILY
Qty: 16 G | Refills: 11 | Status: SHIPPED | OUTPATIENT
Start: 2024-05-22 | End: 2025-05-22

## 2024-05-22 ASSESSMENT — LIFESTYLE VARIABLES: HOW MANY STANDARD DRINKS CONTAINING ALCOHOL DO YOU HAVE ON A TYPICAL DAY: PATIENT DOES NOT DRINK

## 2024-05-22 ASSESSMENT — PATIENT HEALTH QUESTIONNAIRE - PHQ9
1. LITTLE INTEREST OR PLEASURE IN DOING THINGS: NOT AT ALL
SUM OF ALL RESPONSES TO PHQ9 QUESTIONS 1 AND 2: 0
2. FEELING DOWN, DEPRESSED OR HOPELESS: NOT AT ALL

## 2024-05-22 NOTE — PROGRESS NOTES
"Subjective   Patient ID: Ivanna Addison is a 24 y.o. female who presents for Follow-up.  HPI  23 year old female with PMH of pre-eclampsia presents today for HTN follow up.   Developed pre-eclampsia at end of pregnancy. Has boy/girl twins that are 3 months old.  Breastfeeding.      HTN: nifedipine ER 60mg in AM, 30mg at bedtime     PMH: none  PSH: gallbladder removal (2018)   PFH: updated    Has had two headaches, both resolved with tylenol.     All systems have been reviewed and are negative for complaint other than those mentioned in the HPI.     Objective   /86 (BP Location: Left arm, Patient Position: Sitting, BP Cuff Size: Thigh)   Ht 1.702 m (5' 7\")   Wt 140 kg (309 lb)   Breastfeeding Yes   BMI 48.40 kg/m²    Physical Exam  Constitutional:       General: She is awake.      Appearance: Normal appearance.   HENT:      Head: Normocephalic and atraumatic.   Eyes:      Extraocular Movements: Extraocular movements intact.      Pupils: Pupils are equal, round, and reactive to light.   Cardiovascular:      Rate and Rhythm: Normal rate and regular rhythm.      Heart sounds: S1 normal and S2 normal. No murmur heard.  Pulmonary:      Effort: Pulmonary effort is normal.      Breath sounds: Normal breath sounds.   Musculoskeletal:      Cervical back: Normal range of motion and neck supple.      Right lower leg: No edema.      Left lower leg: No edema.   Skin:     General: Skin is warm and dry.   Neurological:      General: No focal deficit present.      Mental Status: She is alert and oriented to person, place, and time.   Psychiatric:         Mood and Affect: Mood and affect normal.         Behavior: Behavior normal. Behavior is cooperative.         Thought Content: Thought content normal.         Judgment: Judgment normal.     Ivanna was seen today for follow-up.  Diagnoses and all orders for this visit:  Seasonal allergic rhinitis due to pollen (Primary)  -     fluticasone (Flonase) 50 mcg/actuation nasal " spray; Administer 1 spray into each nostril once daily. Shake gently. Before first use, prime pump. After use, clean tip and replace cap.  Primary hypertension   - At goal in office   - Home BP have been high, but patient reports cuff is too small    - Bring cuff to next appt to compare   - Two readings in office both at goal. Continue current medication.     Follow up in 3 months.

## 2024-06-10 ENCOUNTER — APPOINTMENT (OUTPATIENT)
Dept: PHYSICAL THERAPY | Facility: CLINIC | Age: 24
End: 2024-06-10
Payer: COMMERCIAL

## 2024-06-17 ENCOUNTER — APPOINTMENT (OUTPATIENT)
Dept: PHYSICAL THERAPY | Facility: CLINIC | Age: 24
End: 2024-06-17
Payer: COMMERCIAL

## 2024-06-24 ENCOUNTER — APPOINTMENT (OUTPATIENT)
Dept: PHYSICAL THERAPY | Facility: CLINIC | Age: 24
End: 2024-06-24
Payer: COMMERCIAL

## 2024-07-01 ENCOUNTER — APPOINTMENT (OUTPATIENT)
Dept: PHYSICAL THERAPY | Facility: CLINIC | Age: 24
End: 2024-07-01
Payer: COMMERCIAL

## 2024-07-22 ENCOUNTER — CLINICAL SUPPORT (OUTPATIENT)
Dept: PHYSICAL THERAPY | Facility: CLINIC | Age: 24
End: 2024-07-22
Payer: COMMERCIAL

## 2024-07-22 DIAGNOSIS — R10.2 PELVIC PAIN: ICD-10-CM

## 2024-07-22 DIAGNOSIS — N81.9 FEMALE GENITAL PROLAPSE, UNSPECIFIED TYPE: ICD-10-CM

## 2024-07-22 DIAGNOSIS — M62.89 PELVIC FLOOR DYSFUNCTION: Primary | ICD-10-CM

## 2024-07-22 PROCEDURE — 97110 THERAPEUTIC EXERCISES: CPT | Mod: GP | Performed by: PHYSICAL THERAPIST

## 2024-07-22 PROCEDURE — 97535 SELF CARE MNGMENT TRAINING: CPT | Mod: GP | Performed by: PHYSICAL THERAPIST

## 2024-07-22 PROCEDURE — 97161 PT EVAL LOW COMPLEX 20 MIN: CPT | Mod: GP | Performed by: PHYSICAL THERAPIST

## 2024-07-22 ASSESSMENT — ENCOUNTER SYMPTOMS
LOSS OF SENSATION IN FEET: 0
OCCASIONAL FEELINGS OF UNSTEADINESS: 0
DEPRESSION: 0

## 2024-07-22 ASSESSMENT — PATIENT HEALTH QUESTIONNAIRE - PHQ9
SUM OF ALL RESPONSES TO PHQ9 QUESTIONS 1 AND 2: 0
2. FEELING DOWN, DEPRESSED OR HOPELESS: NOT AT ALL
1. LITTLE INTEREST OR PLEASURE IN DOING THINGS: NOT AT ALL

## 2024-07-22 ASSESSMENT — COLUMBIA-SUICIDE SEVERITY RATING SCALE - C-SSRS
1. IN THE PAST MONTH, HAVE YOU WISHED YOU WERE DEAD OR WISHED YOU COULD GO TO SLEEP AND NOT WAKE UP?: NO
6. HAVE YOU EVER DONE ANYTHING, STARTED TO DO ANYTHING, OR PREPARED TO DO ANYTHING TO END YOUR LIFE?: NO
2. HAVE YOU ACTUALLY HAD ANY THOUGHTS OF KILLING YOURSELF?: NO

## 2024-07-22 NOTE — PROGRESS NOTES
Physical Therapy    Physical Therapy Evaluation and Treatment      Patient Name: Ivanna Addison  MRN: [id number]  Today's Date: 2024    Time Entry:   Time Calculation  Start Time: 1430  Stop Time: 1520  Time Calculation (min): 50 min  PT Evaluation Time Entry  PT Evaluation (Low) Time Entry: 20  PT Therapeutic Procedures Time Entry  Therapeutic Exercise Time Entry: 20  Self-Care/Home Mgmt Training: 10                   Current Problem:   1. Pelvic floor dysfunction  Referral to Physical Therapy    Follow Up In Physical Therapy      2. Pelvic pain  Referral to Physical Therapy      3. Female genital prolapse, unspecified type  Referral to Physical Therapy    Follow Up In Physical Therapy              Referring provider: Dr. Singer    Insurance:  Visit #: 1  Approved number of visits: 50  Auth Required: no      Precautions: no fall risk  Relevant PMH: HTN, gallbladder removed , 3 vaginal deliveries (, , ), 1  (24) twins     Assessment: Pt is 24 y.o. female 6 months post partum to twins delivered via caesarian on 24, currently  c/o difficulty emptying bladder when voiding, pressure, and feeling vaginal bulge due to POP.  Pt demonstrates round shoulder posture, decreased B hip strength, oblique dominant core strategy, adherent  scar, and decreased pelvic floor strength/endurance/coordination. Pt has minimal descent of POP with cues for bearing down while in hooklying position. Pt is a good candidate for skilled PT intervention to meet outlined goals and improve QOL.     Plan  Interventions: Biofeedback, Cryotherapy, Dry Needling, Education/Instruction, Electrical Stimulation, Home Program, Hot Pack, Kinesiotaping, Manual Therapy, Neuromuscular Re-education, Self Care/Home Management, self-care, Therapeutic Exercises, Ultrasound  Frequency and Duration: 1x week for 4-6 weeks, then every other week for 4-6 weeks if needed  Rehab Potential: good  Plan of Care Agreement:  patient    Goals:  Pt will meet the following goals in 12 weeks    Pt will achieve neutral sitting posture in sitting and standing by activating TA without cues to  load on joints of spine, pelvis, etc.  Pt will increase bilateral hip strength to 5/5 to help stabilize pelvis during squats, stairs, lifting, pushing, pulling, etc.  Pt will increase pelvic floor strength >/=4/5 for at least 10 seconds followed by complete relaxation demonstrating improved pelvic floor mobility and control  Improve FEMALE NIH-CPSI total score by at least 6 points (MCID) to improve QOL  Pt will void without feeling the need to double void > 75% of time  Pt will increase void interval to every 3-4 hours allowing for normal work, community, and home tasks      Subjective:    Chief Complaint: Pt states she was feeling a bulge and was told that she has a prolapsed bladder.  Feels like she can't completely empty and has to go to the bathroom frequently. Double voiding to fully empty.  Denies pain.  Feels like a tampon is falling out.       Relevant PMH: HTN, gallbladder removed , 3 vaginal deliveries (, , ), 1  (24) twins     No history of painful intercourse     Bladder:  Urgency: no  Daytime Frequency: every hour or so   Nighttime Frequency: no  Leakage: no  Protection: no  Incomplete emptying/weak stream/spraying/UTI: yes/no/no/no  Fluid intake: 1 gallon per day, pop 4 x cans caffeine, body armour x 2    Currently breastfeeding but hoping to wean them soon    Bowel:   Frequency: 1-2x per day  Diarrhea: no  Constipation: no  Fiber/stool softeners: no    Home Set up: no concern    Work: digital cinematographer, taking pictures and videos of  babies, part time, 3 hospital systems    Exercise: no    Pt goals for PT: strengthen pelvic floor     Objective:  Ortho:    Posture: round shoulders, forward head, excessive lumbar lordosis in standing    Lumbar AROM loss:  Flexion: nil  Extension: nil  RSB:  "nil  LSB: nil    SLS: > 10 seconds R/L    Flexibility: nil loss BLE     Strength:  Hip flexion: R 4+, L 4+  Hip abduction: R 3+, L 3+  Hip Extension: R 3+, L 3+    Abdominal activation:  Palpable oblique contraction with cues for isometric     Other:   scar: well healed, no sign of infection, palpable scar tissue and decreased mobility all directions of scar    Pelvic Floor:    External:   Atrophy/erythema/prolapse/hemorrhoids: -/-/none visible exterior/-  Voluntary contract/relax: present/present  Involuntary contract/relax: absent    Internal:  Strength: vaginal   P: 3  E: 5  R: 5  F: 4    Palpation: no pain or tenderness with palpation, tightness to B OI but not painful    Outcome Measure:  Female NIH-CPSI= 19    Treatment:   1. Initial evaluation including internal vaginal assessment of pelvic floor muscles-verbal consent obtained   2. Pt ed on diagnosis, prognosis, goals of PT, expectations   3. HEP (see below) ed on normal vs abnormal response  4. Education and demonstration on surgical incision for scar tissue, improved sensation, appearance, etc.  5. Standing and hooklying pelvic floor contraction with education    Access Code: [email or web address]  URL: [email or web address]  Date: 2024  Prepared by: Kourtney Goff    Exercises  - Supine Bridge  - 2 x daily - 7 x weekly - 2 sets - 10 reps - 5 hold  - Clamshell  - 2 x daily - 7 x weekly - 2 sets - 10 reps - 5 hold  - Sidelying Hip Abduction  - 2 x daily - 7 x weekly - 2 sets - 10 reps  - Hooklying Transversus Abdominis Palpation  - 2 x daily - 7 x weekly - 1 sets - 10 reps - 5 hold  - HL kegal (elevator) hold 3-5\", 5-10x 2x day         "

## 2024-08-05 ENCOUNTER — APPOINTMENT (OUTPATIENT)
Dept: PHYSICAL THERAPY | Facility: CLINIC | Age: 24
End: 2024-08-05
Payer: COMMERCIAL

## 2024-08-19 ENCOUNTER — APPOINTMENT (OUTPATIENT)
Dept: PHYSICAL THERAPY | Facility: CLINIC | Age: 24
End: 2024-08-19
Payer: COMMERCIAL

## 2024-08-21 ENCOUNTER — APPOINTMENT (OUTPATIENT)
Dept: PRIMARY CARE | Facility: CLINIC | Age: 24
End: 2024-08-21
Payer: COMMERCIAL

## 2024-08-21 VITALS
SYSTOLIC BLOOD PRESSURE: 138 MMHG | DIASTOLIC BLOOD PRESSURE: 86 MMHG | WEIGHT: 293 LBS | HEIGHT: 67 IN | BODY MASS INDEX: 45.99 KG/M2

## 2024-08-21 DIAGNOSIS — I10 CHRONIC HYPERTENSION: ICD-10-CM

## 2024-08-21 DIAGNOSIS — Z30.09 BIRTH CONTROL COUNSELING: Primary | ICD-10-CM

## 2024-08-21 PROCEDURE — 3075F SYST BP GE 130 - 139MM HG: CPT

## 2024-08-21 PROCEDURE — 3008F BODY MASS INDEX DOCD: CPT

## 2024-08-21 PROCEDURE — 3079F DIAST BP 80-89 MM HG: CPT

## 2024-08-21 PROCEDURE — 1036F TOBACCO NON-USER: CPT

## 2024-08-21 PROCEDURE — 99214 OFFICE O/P EST MOD 30 MIN: CPT

## 2024-08-21 RX ORDER — DROSPIRENONE 4 MG/1
4 TABLET, FILM COATED ORAL DAILY
Qty: 84 TABLET | Refills: 0 | Status: SHIPPED | OUTPATIENT
Start: 2024-08-21

## 2024-08-21 RX ORDER — AMLODIPINE BESYLATE 5 MG/1
5 TABLET ORAL DAILY
Qty: 90 TABLET | Refills: 0 | Status: SHIPPED | OUTPATIENT
Start: 2024-08-21 | End: 2024-11-19

## 2024-08-21 NOTE — PROGRESS NOTES
"Subjective   Patient ID: Ivanna Addison is a 24 y.o. female who presents for Follow-up.  HPI  24 year old female with PMH of pre-eclampsia presents today for HTN follow up.   Developed pre-eclampsia at end of pregnancy. Has boy/girl twins that are 3 months old.  Breastfeeding.      HTN:  --> stopped breast feeding, will switch to amlodipine 5mg, can increase to 10mg if not at goal     All systems have been reviewed and are negative for complaint other than those mentioned in the HPI.     Objective   /86 (BP Location: Left arm, Patient Position: Sitting, BP Cuff Size: Thigh)   Ht 1.702 m (5' 7\")   Wt 146 kg (322 lb)   BMI 50.43 kg/m²    Physical Exam  Constitutional:       General: She is awake.      Appearance: Normal appearance.   HENT:      Head: Normocephalic and atraumatic.   Eyes:      Extraocular Movements: Extraocular movements intact.      Pupils: Pupils are equal, round, and reactive to light.   Cardiovascular:      Rate and Rhythm: Normal rate and regular rhythm.      Heart sounds: S1 normal and S2 normal. No murmur heard.  Pulmonary:      Effort: Pulmonary effort is normal.      Breath sounds: Normal breath sounds.   Musculoskeletal:      Cervical back: Normal range of motion and neck supple.      Right lower leg: No edema.      Left lower leg: No edema.   Skin:     General: Skin is warm and dry.   Neurological:      General: No focal deficit present.      Mental Status: She is alert and oriented to person, place, and time.   Psychiatric:         Mood and Affect: Mood and affect normal.         Behavior: Behavior normal. Behavior is cooperative.         Thought Content: Thought content normal.         Judgment: Judgment normal.     Ivanna was seen today for follow-up.  Diagnoses and all orders for this visit:  Birth control counseling (Primary)  -     Wishes to start birth control, currently using condoms with boyfriend  - Given HTN, will start minipill  - Risks, benefits, side effects " discussed. She tolerated Slynd in the past with no issues   - Reinforced need for back up birthcontrol at start of Slynd, verbalizes understanding.   - Recommend patient follow up with OBGYN as she is considering nexplanon vs IUD  - drospirenone, contraceptive, (Slynd) 4 mg (28) tablet; Take 1 tablet by mouth once daily.  Chronic hypertension  -    No longer breastfeeding. Switch from nifedipine to amlodipine  - Reporting headaches, possibly due to nifedipine.  - Continue monitoring BP at home, keep long, bring to next appt.   -  amLODIPine (Norvasc) 5 mg tablet; Take 1 tablet (5 mg) by mouth once daily.    Follow up in 3 months.

## 2024-09-03 ENCOUNTER — APPOINTMENT (OUTPATIENT)
Dept: PHYSICAL THERAPY | Facility: CLINIC | Age: 24
End: 2024-09-03
Payer: COMMERCIAL

## 2024-09-23 ENCOUNTER — APPOINTMENT (OUTPATIENT)
Dept: OBSTETRICS AND GYNECOLOGY | Facility: CLINIC | Age: 24
End: 2024-09-23
Payer: COMMERCIAL

## 2024-09-27 ENCOUNTER — APPOINTMENT (OUTPATIENT)
Dept: OBSTETRICS AND GYNECOLOGY | Facility: CLINIC | Age: 24
End: 2024-09-27
Payer: COMMERCIAL

## 2024-11-11 ENCOUNTER — TELEPHONE (OUTPATIENT)
Dept: PHARMACY | Facility: HOSPITAL | Age: 24
End: 2024-11-11
Payer: COMMERCIAL

## 2024-11-11 ENCOUNTER — HOSPITAL ENCOUNTER (EMERGENCY)
Facility: HOSPITAL | Age: 24
Discharge: HOME | End: 2024-11-11
Payer: COMMERCIAL

## 2024-11-11 VITALS
SYSTOLIC BLOOD PRESSURE: 141 MMHG | TEMPERATURE: 98.3 F | HEART RATE: 79 BPM | DIASTOLIC BLOOD PRESSURE: 99 MMHG | RESPIRATION RATE: 16 BRPM | HEIGHT: 67 IN | BODY MASS INDEX: 45.99 KG/M2 | OXYGEN SATURATION: 98 % | WEIGHT: 293 LBS

## 2024-11-11 DIAGNOSIS — R03.0 ELEVATED BLOOD PRESSURE READING: ICD-10-CM

## 2024-11-11 DIAGNOSIS — B96.89 BACTERIAL VAGINOSIS: ICD-10-CM

## 2024-11-11 DIAGNOSIS — N76.0 BACTERIAL VAGINOSIS: ICD-10-CM

## 2024-11-11 DIAGNOSIS — Z11.3 SCREEN FOR STD (SEXUALLY TRANSMITTED DISEASE): Primary | ICD-10-CM

## 2024-11-11 DIAGNOSIS — Z53.21 ELOPED FROM EMERGENCY DEPARTMENT: ICD-10-CM

## 2024-11-11 LAB
APPEARANCE UR: CLEAR
BILIRUB UR STRIP.AUTO-MCNC: NEGATIVE MG/DL
CLUE CELLS SPEC QL WET PREP: PRESENT
COLOR UR: NORMAL
GLUCOSE UR STRIP.AUTO-MCNC: NORMAL MG/DL
HCG UR QL IA.RAPID: NEGATIVE
KETONES UR STRIP.AUTO-MCNC: NEGATIVE MG/DL
LEUKOCYTE ESTERASE UR QL STRIP.AUTO: NEGATIVE
NITRITE UR QL STRIP.AUTO: NEGATIVE
PH UR STRIP.AUTO: 6 [PH]
PROT UR STRIP.AUTO-MCNC: NEGATIVE MG/DL
RBC # UR STRIP.AUTO: NEGATIVE /UL
SP GR UR STRIP.AUTO: 1.02
T VAGINALIS SPEC QL WET PREP: ABNORMAL
UROBILINOGEN UR STRIP.AUTO-MCNC: NORMAL MG/DL
WBC VAG QL WET PREP: ABNORMAL
YEAST VAG QL WET PREP: ABNORMAL

## 2024-11-11 PROCEDURE — 87210 SMEAR WET MOUNT SALINE/INK: CPT

## 2024-11-11 PROCEDURE — RXMED WILLOW AMBULATORY MEDICATION CHARGE

## 2024-11-11 PROCEDURE — 87491 CHLMYD TRACH DNA AMP PROBE: CPT | Mod: AHULAB

## 2024-11-11 PROCEDURE — 81003 URINALYSIS AUTO W/O SCOPE: CPT

## 2024-11-11 PROCEDURE — 81025 URINE PREGNANCY TEST: CPT

## 2024-11-11 PROCEDURE — 99283 EMERGENCY DEPT VISIT LOW MDM: CPT

## 2024-11-11 RX ORDER — METRONIDAZOLE 500 MG/1
500 TABLET ORAL 2 TIMES DAILY
Qty: 14 TABLET | Refills: 0 | Status: SHIPPED | OUTPATIENT
Start: 2024-11-11 | End: 2024-11-11 | Stop reason: WASHOUT

## 2024-11-11 RX ORDER — METRONIDAZOLE 7.5 MG/G
GEL VAGINAL NIGHTLY
Qty: 70 G | Refills: 0 | Status: SHIPPED | OUTPATIENT
Start: 2024-11-11 | End: 2024-11-17

## 2024-11-11 ASSESSMENT — PAIN - FUNCTIONAL ASSESSMENT: PAIN_FUNCTIONAL_ASSESSMENT: 0-10

## 2024-11-11 ASSESSMENT — COLUMBIA-SUICIDE SEVERITY RATING SCALE - C-SSRS
6. HAVE YOU EVER DONE ANYTHING, STARTED TO DO ANYTHING, OR PREPARED TO DO ANYTHING TO END YOUR LIFE?: NO
1. IN THE PAST MONTH, HAVE YOU WISHED YOU WERE DEAD OR WISHED YOU COULD GO TO SLEEP AND NOT WAKE UP?: NO
2. HAVE YOU ACTUALLY HAD ANY THOUGHTS OF KILLING YOURSELF?: NO

## 2024-11-11 ASSESSMENT — PAIN SCALES - GENERAL: PAINLEVEL_OUTOF10: 0 - NO PAIN

## 2024-11-11 NOTE — ED PROVIDER NOTES
HPI   Chief Complaint   Patient presents with   • Exposure to STD       24-year-old female presents the ED today with a chief concern of wants STD test.  Patient reports that she recently got out of a relationship and would like to be STD tested.  She denies any known exposure to STDs however is concerned that her partner may have possibly cheated.  She denies any current symptoms.  She denies any fevers.  She denies any nausea or vomiting.  She denies any vaginal discharge.  She denies any urinary symptoms.  She denies any chest pain or shortness of breath.  She denies any chance of pregnancy.  She denies lightheadedness or dizziness or syncope.  Denies any ulcers or lesions in the genital area.  She has no other symptoms or concern at this time.  She would like to self swab.      History provided by:  Patient   used: No            Patient History   Past Medical History:   Diagnosis Date   • Chronic hypertension    • Eczema      Past Surgical History:   Procedure Laterality Date   • CHOLECYSTECTOMY N/A 2018     Family History   Problem Relation Name Age of Onset   • Hypertension Father     • Diabetes Maternal Grandmother     • Hypertension Maternal Grandmother     • Lung cancer Maternal Grandfather       Social History     Tobacco Use   • Smoking status: Never   • Smokeless tobacco: Never   Vaping Use   • Vaping status: Never Used   Substance Use Topics   • Alcohol use: Never   • Drug use: Never       Physical Exam   ED Triage Vitals [11/11/24 1245]   Temperature Heart Rate Respirations BP   36.8 °C (98.3 °F) 79 16 (!) 141/99      Pulse Ox Temp Source Heart Rate Source Patient Position   98 % Temporal Monitor --      BP Location FiO2 (%)     -- --       Physical Exam  Constitutional: Vital signs per nursing notes.  Well developed, well nourished.  No acute distress.    Eyes: conjunctivae and lids normal  ENT: Ears normal externally; face symmetric. voice normal  Neck: neck supple, no  meningismus; trachea midline without deviation  Respiratory: normal respiratory effort and excursion; no rales, rhonchi, or wheezes; equal air entry  Cardiovascular: RRR, 2+ pulses extremities   Neurological: normal speech; CN II-XII grossly intact, normal motor and sensory function  GI: no distention, soft, nontender.  No rebound tenderness or guarding.  : Deferred  Musculoskeletal: normal gait and station; normal digits and nails; normal to palpation; normal strength/tone; neurovascular status intact.  Skin: normal to inspection; normal to palpation; no rash    ED Course & MDM   ED Course as of 11/11/24 1430   Mon Nov 11, 2024   1429 Urinalysis shows no evidence of UTI.  Her urine pregnancy test is negative.  Her wet prep shows evidence of clue cells indicating bacterial vaginosis []   1429 Called patient no answer. Sent flagyl to pharmacy  []      ED Course User Index  [] Maxwell Cruz PA-C         Diagnoses as of 11/11/24 1430   Screen for STD (sexually transmitted disease)   Bacterial vaginosis   Elevated blood pressure reading   Eloped from emergency department                 No data recorded     Les Coma Scale Score: 15 (11/11/24 1245 : David Daniels RN)                           Medical Decision Making  24-year-old female presents the ED today with a chief concern of wants STD test.   vital signs reassuring.  Patient overall appears well and is nontoxic-appearing.  Patient has full range of motion of the neck without any meningismus.  Satting well on room air.  Not hypoxic.  Not tachycardic.  Afebrile.  Her urinalysis shows no evidence of UTI and her pregnancy test is negative.  Her wet prep shows evidence of clue cells indicating bacterial vaginosis.  She was also tested for gonorrhea and chlamydia but was not treated in advance for these.  I did send Flagyl to her pharmacy.  Patient left without treatment being complete.  Unable to provide full history, physical exam, and medical decision  making given the fact that patient left without treatment being complete.  I called the patient but she did not answer.  She did tell nursing staff that she was leaving.    Differential diagnosis: STD, BV, candidiasis, screen for STD, pyelonephritis, UTI PID, TOA    Disposition/treatment  1.  See diagnosis    Patient left without treatment being complete        Procedure  Procedures     Maxwell Cruz PA-C  11/11/24 1711

## 2024-11-11 NOTE — PROGRESS NOTES
EDPD Note: Antibiotics Reviewed and Warranted    Contacted Mr./Mrs./Ms. Ivanna Addison regarding a positive Clue cells  culture/result that was taken during their recent emergency room visit. I completed education with patient . The patient is being treated appropriately with Metronidazole 0.75%.     Clue Cells  None Seen Present Abnormal      No further follow up needed from EDPD Team.     Tri Moffett, RPh

## 2024-11-11 NOTE — PROGRESS NOTES
EDPD Note: Antibiotics Reviewed and Warranted    Contacted Mr./Mrs./Ms. Ivanna Addison regarding a positive Clue cells  culture/result that was taken during their recent emergency room visit. I completed education with patient . The patient is being treated appropriately with Metronidazole 0.75% gel.     Clue Cells  None Seen Present Abnormal      No further follow up needed from EDPD Team.     Tri Moffett, RPh

## 2024-11-12 ENCOUNTER — PHARMACY VISIT (OUTPATIENT)
Dept: PHARMACY | Facility: CLINIC | Age: 24
End: 2024-11-12
Payer: MEDICAID

## 2024-11-12 LAB
C TRACH RRNA SPEC QL NAA+PROBE: NEGATIVE
N GONORRHOEA DNA SPEC QL PROBE+SIG AMP: NEGATIVE

## 2024-11-21 ENCOUNTER — APPOINTMENT (OUTPATIENT)
Dept: LAB | Facility: LAB | Age: 24
End: 2024-11-21
Payer: COMMERCIAL

## 2024-11-21 ENCOUNTER — LAB (OUTPATIENT)
Dept: LAB | Facility: LAB | Age: 24
End: 2024-11-21
Payer: COMMERCIAL

## 2024-11-21 ENCOUNTER — APPOINTMENT (OUTPATIENT)
Dept: PRIMARY CARE | Facility: CLINIC | Age: 24
End: 2024-11-21
Payer: COMMERCIAL

## 2024-11-21 VITALS
HEIGHT: 67 IN | SYSTOLIC BLOOD PRESSURE: 130 MMHG | WEIGHT: 293 LBS | BODY MASS INDEX: 45.99 KG/M2 | DIASTOLIC BLOOD PRESSURE: 83 MMHG

## 2024-11-21 DIAGNOSIS — I10 CHRONIC HYPERTENSION: ICD-10-CM

## 2024-11-21 DIAGNOSIS — R10.9 RIGHT SIDED ABDOMINAL PAIN: ICD-10-CM

## 2024-11-21 DIAGNOSIS — Z30.09 BIRTH CONTROL COUNSELING: ICD-10-CM

## 2024-11-21 DIAGNOSIS — Z00.00 HEALTH MAINTENANCE EXAMINATION: Primary | ICD-10-CM

## 2024-11-21 DIAGNOSIS — Z00.00 HEALTH MAINTENANCE EXAMINATION: ICD-10-CM

## 2024-11-21 LAB
25(OH)D3 SERPL-MCNC: 14 NG/ML (ref 30–100)
ERYTHROCYTE [DISTWIDTH] IN BLOOD BY AUTOMATED COUNT: 15.2 % (ref 11.5–14.5)
EST. AVERAGE GLUCOSE BLD GHB EST-MCNC: 126 MG/DL
HBA1C MFR BLD: 6 %
HCT VFR BLD AUTO: 38.4 % (ref 36–46)
HGB BLD-MCNC: 12 G/DL (ref 12–16)
MCH RBC QN AUTO: 26.1 PG (ref 26–34)
MCHC RBC AUTO-ENTMCNC: 31.3 G/DL (ref 32–36)
MCV RBC AUTO: 84 FL (ref 80–100)
NRBC BLD-RTO: 0 /100 WBCS (ref 0–0)
PLATELET # BLD AUTO: 301 X10*3/UL (ref 150–450)
RBC # BLD AUTO: 4.6 X10*6/UL (ref 4–5.2)
TSH SERPL-ACNC: 0.58 MIU/L (ref 0.44–3.98)
WBC # BLD AUTO: 5.9 X10*3/UL (ref 4.4–11.3)

## 2024-11-21 PROCEDURE — 83036 HEMOGLOBIN GLYCOSYLATED A1C: CPT

## 2024-11-21 PROCEDURE — 36415 COLL VENOUS BLD VENIPUNCTURE: CPT

## 2024-11-21 PROCEDURE — 84443 ASSAY THYROID STIM HORMONE: CPT

## 2024-11-21 PROCEDURE — 82306 VITAMIN D 25 HYDROXY: CPT

## 2024-11-21 PROCEDURE — 85027 COMPLETE CBC AUTOMATED: CPT

## 2024-11-21 PROCEDURE — 80061 LIPID PANEL: CPT

## 2024-11-21 PROCEDURE — 82570 ASSAY OF URINE CREATININE: CPT

## 2024-11-21 PROCEDURE — 80053 COMPREHEN METABOLIC PANEL: CPT

## 2024-11-21 PROCEDURE — 83690 ASSAY OF LIPASE: CPT

## 2024-11-21 PROCEDURE — RXMED WILLOW AMBULATORY MEDICATION CHARGE

## 2024-11-21 PROCEDURE — 82043 UR ALBUMIN QUANTITATIVE: CPT

## 2024-11-21 RX ORDER — AMLODIPINE BESYLATE 5 MG/1
5 TABLET ORAL DAILY
Qty: 90 TABLET | Refills: 1 | Status: SHIPPED | OUTPATIENT
Start: 2024-11-21 | End: 2025-05-20

## 2024-11-21 RX ORDER — DROSPIRENONE 4 MG/1
4 TABLET, FILM COATED ORAL DAILY
Qty: 84 TABLET | Refills: 3 | Status: SHIPPED | OUTPATIENT
Start: 2024-11-21

## 2024-11-21 ASSESSMENT — LIFESTYLE VARIABLES: HOW OFTEN DO YOU HAVE A DRINK CONTAINING ALCOHOL: MONTHLY OR LESS

## 2024-11-21 NOTE — PROGRESS NOTES
"Subjective   Patient ID: Ivanna Addison is a 24 y.o. female who presents for Annual Exam.  HPI  24 year old female with PMH of HTN presents today for CPE.     HTN: amlodipine 5mg    Reports intermittent RUQ abdominal pain. First noticed it ~2 months ago. Occurs once a day or every other day, lasts about 10 minutes. Describes it as a 6/10 cramping feeling. Not correlated with eating. Reports normal BM, goes once a day, soft. No nausea, vomiting, or heartburn.      Diet: States only eating 1x daily   Exercise: Minimal  Nicotine: None  ETOH: Once a month, 2-3 drinks   Drug use: None  Dental care: UTD   Vision concerns: None  Hearing concerns: None    All systems have been reviewed and are negative for complaint other than those mentioned in the HPI.     /83   Ht 1.702 m (5' 7\")   Wt 137 kg (302 lb)   BMI 47.30 kg/m²    Objective   Physical Exam  Constitutional:       General: She is awake.      Appearance: Normal appearance.   HENT:      Head: Normocephalic and atraumatic.   Eyes:      Extraocular Movements: Extraocular movements intact.      Conjunctiva/sclera: Conjunctivae normal.      Pupils: Pupils are equal, round, and reactive to light.   Neck:      Thyroid: No thyroid mass or thyromegaly.      Vascular: No carotid bruit.   Cardiovascular:      Rate and Rhythm: Normal rate and regular rhythm.      Pulses: Normal pulses.      Heart sounds: S1 normal and S2 normal. No murmur heard.  Pulmonary:      Effort: Pulmonary effort is normal.      Breath sounds: Normal breath sounds.   Abdominal:      General: Abdomen is flat. Bowel sounds are normal. There is no distension.      Palpations: Abdomen is soft. There is no hepatomegaly, splenomegaly, mass or pulsatile mass.      Tenderness: There is abdominal tenderness. There is no right CVA tenderness, left CVA tenderness, guarding or rebound.      Hernia: No hernia is present.   Musculoskeletal:      Cervical back: Normal range of motion and neck supple.      Right " lower leg: No edema.      Left lower leg: No edema.   Skin:     General: Skin is warm and dry.      Capillary Refill: Capillary refill takes less than 2 seconds.   Neurological:      General: No focal deficit present.      Mental Status: She is alert and oriented to person, place, and time.   Psychiatric:         Mood and Affect: Mood normal.         Behavior: Behavior normal. Behavior is cooperative.         Thought Content: Thought content normal.         Judgment: Judgment normal.     Ivanna was seen today for annual exam.  Diagnoses and all orders for this visit:  Health maintenance examination (Primary)  -     Due for health maintenance labs, ordered  - UTD pap  - UTD vaccines, declined flu today  - Diet and exercise discussed  - Comprehensive Metabolic Panel; Future  -     Hemoglobin A1C; Future  -     Lipid Panel; Future  -     CBC; Future  -     TSH with reflex to Free T4 if abnormal; Future  -     Vitamin D 25-Hydroxy,Total (for eval of Vitamin D levels); Future  Chronic hypertension  -    Stable. Continue current management.    -  amLODIPine (Norvasc) 5 mg tablet; Take 1 tablet (5 mg) by mouth once daily.  -     Albumin-Creatinine Ratio, Urine Random; Future  Birth control counseling  -     drospirenone, contraceptive, (Slynd) 4 mg (28) tablet; Take 1 tablet by mouth once daily.  Right sided abdominal pain  -    Right upper and lower quadrant tenderness  - Has had cholecystectomy in the past  - Not acute, present for 1-2 months, less likely appendicitis   - Given tenderness to palpation, recommend CT scan.   -  Lipase; Future  -     CT abdomen pelvis w IV contrast; Future    Follow up in 3 months

## 2024-11-22 LAB
ALBUMIN SERPL BCP-MCNC: 4.3 G/DL (ref 3.4–5)
ALP SERPL-CCNC: 80 U/L (ref 33–110)
ALT SERPL W P-5'-P-CCNC: 17 U/L (ref 7–45)
ANION GAP SERPL CALC-SCNC: 13 MMOL/L (ref 10–20)
AST SERPL W P-5'-P-CCNC: 11 U/L (ref 9–39)
BILIRUB SERPL-MCNC: 0.5 MG/DL (ref 0–1.2)
BUN SERPL-MCNC: 7 MG/DL (ref 6–23)
CALCIUM SERPL-MCNC: 9.4 MG/DL (ref 8.6–10.6)
CHLORIDE SERPL-SCNC: 103 MMOL/L (ref 98–107)
CHOLEST SERPL-MCNC: 128 MG/DL (ref 0–199)
CHOLESTEROL/HDL RATIO: 2.9
CO2 SERPL-SCNC: 27 MMOL/L (ref 21–32)
CREAT SERPL-MCNC: 0.7 MG/DL (ref 0.5–1.05)
CREAT UR-MCNC: 405.7 MG/DL (ref 20–320)
EGFRCR SERPLBLD CKD-EPI 2021: >90 ML/MIN/1.73M*2
GLUCOSE SERPL-MCNC: 102 MG/DL (ref 74–99)
HDLC SERPL-MCNC: 44.8 MG/DL
LDLC SERPL CALC-MCNC: 69 MG/DL
LIPASE SERPL-CCNC: 10 U/L (ref 9–82)
MICROALBUMIN UR-MCNC: <7 MG/L
MICROALBUMIN/CREAT UR: ABNORMAL MG/G{CREAT}
NON HDL CHOLESTEROL: 83 MG/DL (ref 0–149)
POTASSIUM SERPL-SCNC: 4.1 MMOL/L (ref 3.5–5.3)
PROT SERPL-MCNC: 7 G/DL (ref 6.4–8.2)
SODIUM SERPL-SCNC: 139 MMOL/L (ref 136–145)
TRIGL SERPL-MCNC: 73 MG/DL (ref 0–149)
VLDL: 15 MG/DL (ref 0–40)

## 2024-11-25 ENCOUNTER — PHARMACY VISIT (OUTPATIENT)
Dept: PHARMACY | Facility: CLINIC | Age: 24
End: 2024-11-25
Payer: MEDICAID

## 2024-11-25 DIAGNOSIS — R73.03 PREDIABETES: Primary | ICD-10-CM

## 2025-01-10 DIAGNOSIS — I10 PRIMARY HYPERTENSION: Primary | ICD-10-CM

## 2025-01-13 DIAGNOSIS — O99.013 ANEMIA AFFECTING PREGNANCY IN THIRD TRIMESTER (HHS-HCC): Primary | ICD-10-CM

## 2025-01-13 RX ORDER — ALBUTEROL SULFATE 0.83 MG/ML
3 SOLUTION RESPIRATORY (INHALATION) AS NEEDED
OUTPATIENT
Start: 2025-01-17

## 2025-01-13 RX ORDER — FAMOTIDINE 10 MG/ML
20 INJECTION INTRAVENOUS ONCE AS NEEDED
OUTPATIENT
Start: 2025-01-17

## 2025-01-13 RX ORDER — EPINEPHRINE 0.3 MG/.3ML
0.3 INJECTION SUBCUTANEOUS EVERY 5 MIN PRN
OUTPATIENT
Start: 2025-01-17

## 2025-01-13 RX ORDER — DIPHENHYDRAMINE HYDROCHLORIDE 50 MG/ML
50 INJECTION INTRAMUSCULAR; INTRAVENOUS AS NEEDED
OUTPATIENT
Start: 2025-01-17

## 2025-01-20 ENCOUNTER — OFFICE VISIT (OUTPATIENT)
Dept: URGENT CARE | Age: 25
End: 2025-01-20
Payer: COMMERCIAL

## 2025-01-20 VITALS — HEART RATE: 83 BPM | OXYGEN SATURATION: 98 % | DIASTOLIC BLOOD PRESSURE: 107 MMHG | SYSTOLIC BLOOD PRESSURE: 154 MMHG

## 2025-01-20 DIAGNOSIS — Z11.3 SCREEN FOR STD (SEXUALLY TRANSMITTED DISEASE): ICD-10-CM

## 2025-01-20 LAB
CHLAMYDIA TRACHOMATIS: NOT DETECTED
ELECTRONIC CONTROL: NORMAL
INTERNAL PROCESS CONTROL: NORMAL
NEISSERIA GONORRHOEAE: NOT DETECTED
POC BACTERIAL VAGINITIS (RAPID): NEGATIVE
PREGNANCY TEST URINE, POC: NEGATIVE
TRICHOMONAS VAGINA: NOT DETECTED

## 2025-01-20 NOTE — PATIENT INSTRUCTIONS
You were seen at Urgent Care today for std screening.  All testing was negative.  Monitor for red flags which we spoke about, If you develop symptoms, you should followup with your PCP.     During emergency room stay, your blood pressure was found to be elevated.  High blood pressure is not often diagnosed in the ED due to multiple factors contributing to the reading.  Your elevated reading could be due to undiagnosed high blood pressure.  I recommend you get your blood pressure rechecked with your primary care provider this week.  Please call to schedule this.

## 2025-01-20 NOTE — PROGRESS NOTES
Subjective   Patient ID: Ivanna Addison is a 24 y.o. female. They present today with a chief complaint of std check (Pt states no symptoms or exposure ).    History of Present Illness  Patient is a pleasant 24-year-old female with history of hypertension who presents urgent care today with a concern for possible STD exposure in pregnancy.  She states her last menstrual period was 1/5/2025.  She notes she has had sex twice since then.  She denies any symptoms or complaints.        History provided by:  Patient      Past Medical History  Allergies as of 01/20/2025    (No Known Allergies)       (Not in a hospital admission)         Past Medical History:   Diagnosis Date    Chronic hypertension     Eczema        Past Surgical History:   Procedure Laterality Date    CHOLECYSTECTOMY N/A 2018        reports that she has never smoked. She has never used smokeless tobacco. She reports that she does not drink alcohol and does not use drugs.    Review of Systems  Review of Systems   All other systems reviewed and are negative.                                 Objective    Vitals:    01/20/25 1208   BP: (!) 154/107   Pulse: 83   SpO2: 98%     Patient's last menstrual period was 01/05/2025.    Physical Exam  Vitals and nursing note reviewed.   Constitutional:       General: She is not in acute distress.     Appearance: Normal appearance. She is not ill-appearing, toxic-appearing or diaphoretic.   HENT:      Head: Normocephalic and atraumatic.      Mouth/Throat:      Mouth: Mucous membranes are moist.   Eyes:      Extraocular Movements: Extraocular movements intact.      Conjunctiva/sclera: Conjunctivae normal.      Pupils: Pupils are equal, round, and reactive to light.   Cardiovascular:      Rate and Rhythm: Normal rate and regular rhythm.      Pulses: Normal pulses.      Heart sounds: Normal heart sounds.   Pulmonary:      Effort: Pulmonary effort is normal. No respiratory distress.      Breath sounds: Normal breath sounds. No  stridor. No wheezing, rhonchi or rales.   Chest:      Chest wall: No tenderness.   Musculoskeletal:         General: Normal range of motion.      Cervical back: Normal range of motion and neck supple.   Skin:     General: Skin is warm and dry.      Capillary Refill: Capillary refill takes less than 2 seconds.   Neurological:      General: No focal deficit present.      Mental Status: She is alert and oriented to person, place, and time.   Psychiatric:         Mood and Affect: Mood normal.         Behavior: Behavior normal.         Procedures      Assessment/Plan   Allergies, medications, history, and pertinent labs/EKGs/Imaging reviewed by ALESHA Thrasher.     Medical Decision Making    Patient is well appearing, afebrile, non toxic, not hypoxic, and appropriate for outpatient treatment and management at time of evaluation. Patient presents with concern for possible STD exposure in pregnancy.     Differential includes but not limited to: Chlamydia, gonorrhea, trichomoniasis, pregnancy, other    Physical exam is unremarkable.  Pregnancy is negative.  BV is negative.  STD testing is also negative.    Patient notified of the results.  She understands that if she develops symptoms, she should return or go to her PCP.  She was discharged stable condition.  Questions and concerns addressed.           Orders and Diagnoses  Diagnoses and all orders for this visit:  Screen for STD (sexually transmitted disease)  -     POCT pregnancy, urine manually resulted  -     POCT STI PCR (GC,TRICH) manually resulted  -     POCT BV Blue Rapid - Bacterial Vaginitis manually resulted      Medical Admin Record      Follow Up Instructions  No follow-ups on file.    Patient disposition: Home    Electronically signed by ALESHA Thrasher  12:30 PM

## 2025-01-21 ENCOUNTER — HOSPITAL ENCOUNTER (OUTPATIENT)
Dept: RADIOLOGY | Facility: CLINIC | Age: 25
End: 2025-01-21
Payer: COMMERCIAL

## 2025-01-30 LAB
ALBUMIN SERPL-MCNC: 4.2 G/DL (ref 3.6–5.1)
BUN SERPL-MCNC: 8 MG/DL (ref 7–25)
BUN/CREAT SERPL: ABNORMAL (CALC) (ref 6–22)
CALCIUM SERPL-MCNC: 9.1 MG/DL (ref 8.6–10.2)
CHLORIDE SERPL-SCNC: 105 MMOL/L (ref 98–110)
CO2 SERPL-SCNC: 24 MMOL/L (ref 20–32)
CREAT SERPL-MCNC: 0.75 MG/DL (ref 0.5–0.96)
EGFRCR SERPLBLD CKD-EPI 2021: 113 ML/MIN/1.73M2
GLUCOSE SERPL-MCNC: 107 MG/DL (ref 65–99)
PHOSPHATE SERPL-MCNC: 3 MG/DL (ref 2.5–4.5)
POTASSIUM SERPL-SCNC: 4.1 MMOL/L (ref 3.5–5.3)
SODIUM SERPL-SCNC: 137 MMOL/L (ref 135–146)

## 2025-01-31 ENCOUNTER — HOSPITAL ENCOUNTER (OUTPATIENT)
Dept: RADIOLOGY | Facility: CLINIC | Age: 25
End: 2025-01-31
Payer: COMMERCIAL

## 2025-02-10 PROCEDURE — RXMED WILLOW AMBULATORY MEDICATION CHARGE

## 2025-02-12 ENCOUNTER — PHARMACY VISIT (OUTPATIENT)
Dept: PHARMACY | Facility: CLINIC | Age: 25
End: 2025-02-12
Payer: MEDICAID

## 2025-02-19 ENCOUNTER — DOCUMENTATION (OUTPATIENT)
Dept: PHYSICAL THERAPY | Facility: CLINIC | Age: 25
End: 2025-02-19
Payer: COMMERCIAL

## 2025-02-19 NOTE — PROGRESS NOTES
Physical Therapy    Discharge Summary    Name: Ivanna Addison  MRN: [id number]  : 2000  Date: 25    Discharge Summary: PT    Discharge Information: Date of discharge 25, Date of last visit 24, Date of evaluation 24, Number of attended visits 1, Referred by Dr. Singer, and Referred for pelvic pain, weakness, postpartum    Therapy Summary: eval and HEP    Discharge Status: unknown     Rehab Discharge Reason: Failed to schedule and/or keep follow-up appointment(s)

## 2025-04-24 ENCOUNTER — OFFICE VISIT (OUTPATIENT)
Dept: URGENT CARE | Age: 25
End: 2025-04-24
Payer: COMMERCIAL

## 2025-04-24 VITALS
DIASTOLIC BLOOD PRESSURE: 85 MMHG | OXYGEN SATURATION: 98 % | RESPIRATION RATE: 16 BRPM | SYSTOLIC BLOOD PRESSURE: 163 MMHG | WEIGHT: 293 LBS | BODY MASS INDEX: 46.99 KG/M2 | TEMPERATURE: 98.3 F | HEART RATE: 79 BPM

## 2025-04-24 DIAGNOSIS — J02.0 STREP PHARYNGITIS: Primary | ICD-10-CM

## 2025-04-24 DIAGNOSIS — J02.9 SORE THROAT: ICD-10-CM

## 2025-04-24 DIAGNOSIS — R30.0 DYSURIA: ICD-10-CM

## 2025-04-24 LAB
POC APPEARANCE, URINE: CLEAR
POC BILIRUBIN, URINE: ABNORMAL
POC BLOOD, URINE: NEGATIVE
POC COLOR, URINE: ABNORMAL
POC GLUCOSE, URINE: NEGATIVE MG/DL
POC HUMAN RHINOVIRUS PCR: NEGATIVE
POC INFLUENZA A VIRUS PCR: NEGATIVE
POC INFLUENZA B VIRUS PCR: NEGATIVE
POC KETONES, URINE: ABNORMAL MG/DL
POC LEUKOCYTES, URINE: NEGATIVE
POC NITRITE,URINE: NEGATIVE
POC PH, URINE: 5.5 PH
POC PROTEIN, URINE: ABNORMAL MG/DL
POC RESPIRATORY SYNCYTIAL VIRUS PCR: NEGATIVE
POC SPECIFIC GRAVITY, URINE: >=1.03
POC STREPTOCOCCUS PYOGENES (GROUP A STREP) PCR: POSITIVE
POC UROBILINOGEN, URINE: 0.2 EU/DL
PREGNANCY TEST URINE, POC: NEGATIVE

## 2025-04-24 RX ORDER — METHYLPREDNISOLONE 4 MG/1
TABLET ORAL
Qty: 21 TABLET | Refills: 0 | Status: SHIPPED | OUTPATIENT
Start: 2025-04-24 | End: 2025-04-30

## 2025-04-24 RX ORDER — AMOXICILLIN 500 MG/1
500 CAPSULE ORAL 2 TIMES DAILY
Qty: 20 CAPSULE | Refills: 0 | Status: SHIPPED | OUTPATIENT
Start: 2025-04-24 | End: 2025-05-04

## 2025-04-24 ASSESSMENT — ENCOUNTER SYMPTOMS
SORE THROAT: 1
FREQUENCY: 1
DYSURIA: 1
CHILLS: 1

## 2025-04-24 NOTE — PROGRESS NOTES
Subjective   Patient ID: Ivanna Addison is a 24 y.o. female. They present today with a chief complaint of Sore Throat and Difficulty Urinating.    History of Present Illness  Patient is a 24-year-old female with no reported urgent care today with a complaint of sore throat and urinary symptoms.  She states her sore throat started this morning shortly after waking.  She also notes having chills last evening.  She states her urinary symptoms which include urgency and frequency started a couple of days ago.  She denies any known fevers, abdominal pain, flank pain, nausea, vomiting, chest pain or shortness of breath.  No other complaints or concerns mention at this time.      History provided by:  Patient  Sore Throat     Difficulty Urinating      Past Medical History  Allergies as of 04/24/2025    (No Known Allergies)       Prescriptions Prior to Admission[1]       Medical History[2]    Surgical History[3]     reports that she has never smoked. She has never used smokeless tobacco. She reports that she does not drink alcohol and does not use drugs.    Review of Systems  Review of Systems   Constitutional:  Positive for chills.   HENT:  Positive for sore throat.    Genitourinary:  Positive for dysuria, frequency and urgency.                                  Objective    Vitals:    04/24/25 1455   BP: 163/85   Pulse: 79   Resp: 16   Temp: 36.8 °C (98.3 °F)   SpO2: 98%   Weight: 136 kg (300 lb)     No LMP recorded (lmp unknown).    Physical Exam  Vitals and nursing note reviewed.   Constitutional:       General: She is not in acute distress.     Appearance: Normal appearance. She is not ill-appearing, toxic-appearing or diaphoretic.   HENT:      Head: Normocephalic and atraumatic.      Right Ear: Tympanic membrane, ear canal and external ear normal.      Left Ear: Tympanic membrane, ear canal and external ear normal.      Nose: Nose normal.      Mouth/Throat:      Mouth: Mucous membranes are moist.      Pharynx:  Oropharyngeal exudate and posterior oropharyngeal erythema present.   Eyes:      Extraocular Movements: Extraocular movements intact.      Conjunctiva/sclera: Conjunctivae normal.      Pupils: Pupils are equal, round, and reactive to light.   Cardiovascular:      Rate and Rhythm: Normal rate and regular rhythm.      Pulses: Normal pulses.      Heart sounds: Normal heart sounds.   Pulmonary:      Effort: Pulmonary effort is normal. No respiratory distress.      Breath sounds: Normal breath sounds. No stridor. No wheezing, rhonchi or rales.   Chest:      Chest wall: No tenderness.   Abdominal:      General: Abdomen is flat.      Palpations: Abdomen is soft.      Tenderness: There is no right CVA tenderness or left CVA tenderness.   Musculoskeletal:         General: Normal range of motion.      Cervical back: Normal range of motion and neck supple.   Skin:     General: Skin is warm and dry.      Capillary Refill: Capillary refill takes less than 2 seconds.   Neurological:      General: No focal deficit present.      Mental Status: She is alert and oriented to person, place, and time.   Psychiatric:         Mood and Affect: Mood normal.         Behavior: Behavior normal.         Procedures      Assessment/Plan   Allergies, medications, history, and pertinent labs/EKGs/Imaging reviewed by ALESHA Thrasher.     Medical Decision Making    Patient is well appearing, afebrile, non toxic, not hypoxic, and appropriate for outpatient treatment and management at time of evaluation. Patient presents with sore throat x 1 day and urinary symptoms x 2 days.     Differential includes but not limited to: Streptococcal pharyngitis, viral pharyngitis, UTI, other    On exam, patient is notably afebrile and appears well-hydrated.  Vitals within normal limits.  Oxygen saturation on room air is 98%.  Lung sounds are clear in all fields bilaterally.  She does have significant erythema of the oropharynx with tonsillar swelling and  exudate. No other signifcant findings.    Pregnancy is negative.  UA is negative for infection or hematuria.  Strep PCR is positive.  Patient provided with a prescription for amoxicillin and Medrol Dosepak to use as directed.  Urine specimen sent to culture.  Patient understands she will be notified of any positive results and started appropriate treatment at that time.    Counseling: Spoke with the patient and discussed today´s findings, in addition to providing specific details for the plan of care and expected course.  Patient was given the opportunity to ask questions.     Discussed return precautions and importance of follow-up. Advised to follow-up with PCP.  We spoke at length regarding the red flags he/she should be aware of and monitor for.  I specifically advised to go to the ED for changing or worsening symptoms, new symptoms, complaint specific precautions, and precautions listed on the discharge paperwork.    The plan of care was mutually agreed upon with the patient. All questions and concerns were answered to the best of my ability with today's information.  Patient was discharged in stable condition.    Dictation software was used in the creation of this note which does not evaluate or correct for typographical, spelling, syntax or grammatical errors.    Orders and Diagnoses  Diagnoses and all orders for this visit:  Sore throat  -     POCT SPOTFIRE R/ST Panel Mini w/Strep A ([employer]) manually resulted  Dysuria  -     POCT UA Automated manually resulted  -     POCT pregnancy, urine manually resulted      Medical Admin Record      Follow Up Instructions  No follow-ups on file.    Patient disposition: Home    Electronically signed by ALESHA Thrasher  3:13 PM       [1] (Not in a hospital admission)  [2]   Past Medical History:  Diagnosis Date    Chronic hypertension     Eczema    [3]   Past Surgical History:  Procedure Laterality Date    CHOLECYSTECTOMY N/A 2018

## 2025-04-26 LAB — BACTERIA UR CULT: NORMAL

## 2025-05-05 PROCEDURE — RXMED WILLOW AMBULATORY MEDICATION CHARGE

## 2025-05-06 ENCOUNTER — APPOINTMENT (OUTPATIENT)
Dept: RADIOLOGY | Facility: CLINIC | Age: 25
End: 2025-05-06
Payer: COMMERCIAL

## 2025-05-09 ENCOUNTER — PHARMACY VISIT (OUTPATIENT)
Dept: PHARMACY | Facility: CLINIC | Age: 25
End: 2025-05-09
Payer: MEDICAID

## 2025-05-17 DIAGNOSIS — I10 CHRONIC HYPERTENSION: ICD-10-CM

## 2025-05-19 RX ORDER — AMLODIPINE BESYLATE 5 MG/1
5 TABLET ORAL DAILY
Qty: 90 TABLET | Refills: 1 | OUTPATIENT
Start: 2025-05-19 | End: 2025-11-15

## (undated) DEVICE — DRAPE PACK, CESAREAN SECTION, CUSTOM, UHC

## (undated) DEVICE — TOWEL PACK, STERILE, 4/PACK, BLUE

## (undated) DEVICE — SUTURE, MONOCRYL, 2-0, 36 IN, CTX, VIOLET